# Patient Record
Sex: FEMALE | Race: BLACK OR AFRICAN AMERICAN | Employment: FULL TIME | URBAN - METROPOLITAN AREA
[De-identification: names, ages, dates, MRNs, and addresses within clinical notes are randomized per-mention and may not be internally consistent; named-entity substitution may affect disease eponyms.]

---

## 2018-11-14 ENCOUNTER — APPOINTMENT (OUTPATIENT)
Dept: ULTRASOUND IMAGING | Age: 28
End: 2018-11-14
Payer: COMMERCIAL

## 2018-11-14 ENCOUNTER — HOSPITAL ENCOUNTER (EMERGENCY)
Age: 28
Discharge: HOME | End: 2018-11-14
Attending: EMERGENCY MEDICINE
Payer: COMMERCIAL

## 2018-11-14 VITALS
WEIGHT: 178.57 LBS | HEIGHT: 62 IN | RESPIRATION RATE: 16 BRPM | TEMPERATURE: 98.2 F | SYSTOLIC BLOOD PRESSURE: 119 MMHG | OXYGEN SATURATION: 100 % | BODY MASS INDEX: 32.86 KG/M2 | HEART RATE: 88 BPM | DIASTOLIC BLOOD PRESSURE: 62 MMHG

## 2018-11-14 DIAGNOSIS — R10.9 ABDOMINAL PAIN AFFECTING PREGNANCY: Primary | ICD-10-CM

## 2018-11-14 DIAGNOSIS — O26.899 ABDOMINAL PAIN AFFECTING PREGNANCY: Primary | ICD-10-CM

## 2018-11-14 LAB
A/G RATIO: 1.4 (ref 1.1–2.2)
ALBUMIN SERPL-MCNC: 4.3 G/DL (ref 3.4–5)
ALP BLD-CCNC: 49 U/L (ref 40–129)
ALT SERPL-CCNC: 9 U/L (ref 10–40)
ANION GAP SERPL CALCULATED.3IONS-SCNC: 9 MMOL/L (ref 3–16)
AST SERPL-CCNC: 32 U/L (ref 15–37)
BASOPHILS ABSOLUTE: 0.1 K/UL (ref 0–0.2)
BASOPHILS RELATIVE PERCENT: 0.8 %
BILIRUB SERPL-MCNC: <0.2 MG/DL (ref 0–1)
BILIRUBIN URINE: NEGATIVE
BLOOD, URINE: NEGATIVE
BUN BLDV-MCNC: 11 MG/DL (ref 7–20)
CALCIUM SERPL-MCNC: 9 MG/DL (ref 8.3–10.6)
CHLORIDE BLD-SCNC: 104 MMOL/L (ref 99–110)
CLARITY: CLEAR
CO2: 22 MMOL/L (ref 21–32)
COLOR: YELLOW
CREAT SERPL-MCNC: 0.5 MG/DL (ref 0.6–1.1)
EOSINOPHILS ABSOLUTE: 0 K/UL (ref 0–0.6)
EOSINOPHILS RELATIVE PERCENT: 0.3 %
EPITHELIAL CELLS, UA: 2 /HPF (ref 0–5)
GFR AFRICAN AMERICAN: >60
GFR NON-AFRICAN AMERICAN: >60
GLOBULIN: 3 G/DL
GLUCOSE BLD-MCNC: 122 MG/DL (ref 70–99)
GLUCOSE URINE: NEGATIVE MG/DL
GONADOTROPIN, CHORIONIC (HCG) QUANT: NORMAL MIU/ML
HCG(URINE) PREGNANCY TEST: POSITIVE
HCT VFR BLD CALC: 39.1 % (ref 36–48)
HEMOGLOBIN: 12.6 G/DL (ref 12–16)
HYALINE CASTS: 6 /LPF (ref 0–8)
KETONES, URINE: ABNORMAL MG/DL
LEUKOCYTE ESTERASE, URINE: NEGATIVE
LYMPHOCYTES ABSOLUTE: 2.1 K/UL (ref 1–5.1)
LYMPHOCYTES RELATIVE PERCENT: 17.7 %
MCH RBC QN AUTO: 27 PG (ref 26–34)
MCHC RBC AUTO-ENTMCNC: 32.1 G/DL (ref 31–36)
MCV RBC AUTO: 84.1 FL (ref 80–100)
MICROSCOPIC EXAMINATION: YES
MONOCYTES ABSOLUTE: 0.8 K/UL (ref 0–1.3)
MONOCYTES RELATIVE PERCENT: 6.7 %
NEUTROPHILS ABSOLUTE: 8.7 K/UL (ref 1.7–7.7)
NEUTROPHILS RELATIVE PERCENT: 74.5 %
NITRITE, URINE: NEGATIVE
PDW BLD-RTO: 13.9 % (ref 12.4–15.4)
PH UA: 6
PLATELET # BLD: 190 K/UL (ref 135–450)
PMV BLD AUTO: 8.2 FL (ref 5–10.5)
POTASSIUM SERPL-SCNC: 3.6 MMOL/L (ref 3.5–5.1)
PROTEIN UA: ABNORMAL MG/DL
RBC # BLD: 4.64 M/UL (ref 4–5.2)
RBC UA: 8 /HPF (ref 0–4)
SODIUM BLD-SCNC: 135 MMOL/L (ref 136–145)
SPECIFIC GRAVITY UA: 1.03
TOTAL PROTEIN: 7.3 G/DL (ref 6.4–8.2)
URINE REFLEX TO CULTURE: ABNORMAL
URINE TYPE: ABNORMAL
UROBILINOGEN, URINE: 1 E.U./DL
WBC # BLD: 11.7 K/UL (ref 4–11)
WBC UA: 2 /HPF (ref 0–5)

## 2018-11-14 PROCEDURE — 2580000003 HC RX 258: Performed by: EMERGENCY MEDICINE

## 2018-11-14 PROCEDURE — 96361 HYDRATE IV INFUSION ADD-ON: CPT

## 2018-11-14 PROCEDURE — 99284 EMERGENCY DEPT VISIT MOD MDM: CPT

## 2018-11-14 PROCEDURE — 84703 CHORIONIC GONADOTROPIN ASSAY: CPT

## 2018-11-14 PROCEDURE — 84702 CHORIONIC GONADOTROPIN TEST: CPT

## 2018-11-14 PROCEDURE — 6360000002 HC RX W HCPCS: Performed by: EMERGENCY MEDICINE

## 2018-11-14 PROCEDURE — 96374 THER/PROPH/DIAG INJ IV PUSH: CPT

## 2018-11-14 PROCEDURE — 76801 OB US < 14 WKS SINGLE FETUS: CPT

## 2018-11-14 PROCEDURE — 76817 TRANSVAGINAL US OBSTETRIC: CPT

## 2018-11-14 PROCEDURE — 81001 URINALYSIS AUTO W/SCOPE: CPT

## 2018-11-14 PROCEDURE — 80053 COMPREHEN METABOLIC PANEL: CPT

## 2018-11-14 PROCEDURE — 85025 COMPLETE CBC W/AUTO DIFF WBC: CPT

## 2018-11-14 RX ORDER — 0.9 % SODIUM CHLORIDE 0.9 %
1000 INTRAVENOUS SOLUTION INTRAVENOUS ONCE
Status: COMPLETED | OUTPATIENT
Start: 2018-11-14 | End: 2018-11-14

## 2018-11-14 RX ORDER — ONDANSETRON 2 MG/ML
4 INJECTION INTRAMUSCULAR; INTRAVENOUS ONCE
Status: COMPLETED | OUTPATIENT
Start: 2018-11-14 | End: 2018-11-14

## 2018-11-14 RX ADMIN — ONDANSETRON 4 MG: 2 INJECTION INTRAMUSCULAR; INTRAVENOUS at 18:40

## 2018-11-14 RX ADMIN — SODIUM CHLORIDE 1000 ML: 9 INJECTION, SOLUTION INTRAVENOUS at 18:40

## 2018-11-14 ASSESSMENT — PAIN DESCRIPTION - DESCRIPTORS: DESCRIPTORS: CRAMPING

## 2018-11-14 ASSESSMENT — PAIN DESCRIPTION - PAIN TYPE: TYPE: ACUTE PAIN

## 2018-11-14 ASSESSMENT — PAIN DESCRIPTION - ORIENTATION: ORIENTATION: LOWER

## 2018-11-14 ASSESSMENT — PAIN SCALES - GENERAL: PAINLEVEL_OUTOF10: 7

## 2018-11-14 ASSESSMENT — PAIN DESCRIPTION - LOCATION: LOCATION: ABDOMEN

## 2018-11-15 NOTE — ED PROVIDER NOTES
Basophils % 0.8 %    Neutrophils # 8.7 (H) 1.7 - 7.7 K/uL    Lymphocytes # 2.1 1.0 - 5.1 K/uL    Monocytes # 0.8 0.0 - 1.3 K/uL    Eosinophils # 0.0 0.0 - 0.6 K/uL    Basophils # 0.1 0.0 - 0.2 K/uL   Comprehensive Metabolic Panel   Result Value Ref Range    Sodium 135 (L) 136 - 145 mmol/L    Potassium 3.6 3.5 - 5.1 mmol/L    Chloride 104 99 - 110 mmol/L    CO2 22 21 - 32 mmol/L    Anion Gap 9 3 - 16    Glucose 122 (H) 70 - 99 mg/dL    BUN 11 7 - 20 mg/dL    CREATININE 0.5 (L) 0.6 - 1.1 mg/dL    GFR Non-African American >60 >60    GFR African American >60 >60    Calcium 9.0 8.3 - 10.6 mg/dL    Total Protein 7.3 6.4 - 8.2 g/dL    Alb 4.3 3.4 - 5.0 g/dL    Albumin/Globulin Ratio 1.4 1.1 - 2.2    Total Bilirubin <0.2 0.0 - 1.0 mg/dL    Alkaline Phosphatase 49 40 - 129 U/L    ALT 9 (L) 10 - 40 U/L    AST 32 15 - 37 U/L    Globulin 3.0 g/dL   Urinalysis Reflex to Culture   Result Value Ref Range    Color, UA YELLOW Straw/Yellow    Clarity, UA Clear Clear    Glucose, Ur Negative Negative mg/dL    Bilirubin Urine Negative Negative    Ketones, Urine TRACE (A) Negative mg/dL    Specific Gravity, UA 1.029 1.005 - 1.030    Blood, Urine Negative Negative    pH, UA 6.0 5.0 - 8.0    Protein, UA TRACE (A) Negative mg/dL    Urobilinogen, Urine 1.0 <2.0 E.U./dL    Nitrite, Urine Negative Negative    Leukocyte Esterase, Urine Negative Negative    Microscopic Examination YES     Urine Reflex to Culture Not Indicated     Urine Type Not Specified    Pregnancy, Urine   Result Value Ref Range    HCG(Urine) Pregnancy Test POSITIVE Detects HCG level >20 MIU/mL   HCG, Quantitative, Pregnancy   Result Value Ref Range    hCG Quant 19160.0 <5.0 mIU/mL   Microscopic Urinalysis   Result Value Ref Range    Hyaline Casts, UA 6 0 - 8 /LPF    WBC, UA 2 0 - 5 /HPF    RBC, UA 8 (H) 0 - 4 /HPF    Epi Cells 2 0 - 5 /HPF          EKG: (All EKG's are interpreted by myself in the absence of a cardiologist)      MDM:  Patient's vital signs are stable.   She

## 2018-12-16 ENCOUNTER — HOSPITAL ENCOUNTER (EMERGENCY)
Age: 28
Discharge: LEFT BEFORE DISCHARGE | End: 2018-12-17
Payer: COMMERCIAL

## 2018-12-16 VITALS
HEART RATE: 94 BPM | TEMPERATURE: 99.1 F | OXYGEN SATURATION: 100 % | WEIGHT: 167.99 LBS | SYSTOLIC BLOOD PRESSURE: 114 MMHG | RESPIRATION RATE: 16 BRPM | HEIGHT: 60 IN | BODY MASS INDEX: 32.98 KG/M2 | DIASTOLIC BLOOD PRESSURE: 79 MMHG

## 2018-12-16 DIAGNOSIS — O21.9 NAUSEA AND VOMITING DURING PREGNANCY: Primary | ICD-10-CM

## 2018-12-16 LAB
A/G RATIO: 1.3 (ref 1.1–2.2)
ALBUMIN SERPL-MCNC: 4.4 G/DL (ref 3.4–5)
ALP BLD-CCNC: 54 U/L (ref 40–129)
ALT SERPL-CCNC: 12 U/L (ref 10–40)
ANION GAP SERPL CALCULATED.3IONS-SCNC: 14 MMOL/L (ref 3–16)
AST SERPL-CCNC: 35 U/L (ref 15–37)
BASOPHILS ABSOLUTE: 0 K/UL (ref 0–0.2)
BASOPHILS RELATIVE PERCENT: 0.2 %
BILIRUB SERPL-MCNC: 0.4 MG/DL (ref 0–1)
BILIRUBIN URINE: ABNORMAL
BLOOD, URINE: ABNORMAL
BUN BLDV-MCNC: 8 MG/DL (ref 7–20)
CALCIUM SERPL-MCNC: 9.4 MG/DL (ref 8.3–10.6)
CHLORIDE BLD-SCNC: 102 MMOL/L (ref 99–110)
CLARITY: ABNORMAL
CO2: 21 MMOL/L (ref 21–32)
COLOR: ABNORMAL
CREAT SERPL-MCNC: <0.5 MG/DL (ref 0.6–1.1)
EOSINOPHILS ABSOLUTE: 0 K/UL (ref 0–0.6)
EOSINOPHILS RELATIVE PERCENT: 0.4 %
EPITHELIAL CELLS, UA: 5 /HPF (ref 0–5)
GFR AFRICAN AMERICAN: >60
GFR NON-AFRICAN AMERICAN: >60
GLOBULIN: 3.4 G/DL
GLUCOSE BLD-MCNC: 89 MG/DL (ref 70–99)
GLUCOSE URINE: NEGATIVE MG/DL
HCT VFR BLD CALC: 41 % (ref 36–48)
HEMOGLOBIN: 13.4 G/DL (ref 12–16)
HYALINE CASTS: 13 /LPF (ref 0–8)
KETONES, URINE: 40 MG/DL
LEUKOCYTE ESTERASE, URINE: NEGATIVE
LIPASE: 10 U/L (ref 13–60)
LYMPHOCYTES ABSOLUTE: 1.4 K/UL (ref 1–5.1)
LYMPHOCYTES RELATIVE PERCENT: 22.6 %
MCH RBC QN AUTO: 27.2 PG (ref 26–34)
MCHC RBC AUTO-ENTMCNC: 32.7 G/DL (ref 31–36)
MCV RBC AUTO: 83.3 FL (ref 80–100)
MICROSCOPIC EXAMINATION: YES
MONOCYTES ABSOLUTE: 0.7 K/UL (ref 0–1.3)
MONOCYTES RELATIVE PERCENT: 11.2 %
NEUTROPHILS ABSOLUTE: 4.1 K/UL (ref 1.7–7.7)
NEUTROPHILS RELATIVE PERCENT: 65.6 %
NITRITE, URINE: NEGATIVE
PDW BLD-RTO: 14.4 % (ref 12.4–15.4)
PH UA: 6
PLATELET # BLD: 192 K/UL (ref 135–450)
PMV BLD AUTO: 8.2 FL (ref 5–10.5)
POTASSIUM SERPL-SCNC: 3.8 MMOL/L (ref 3.5–5.1)
PROTEIN UA: 30 MG/DL
RBC # BLD: 4.92 M/UL (ref 4–5.2)
RBC UA: 32 /HPF (ref 0–4)
SODIUM BLD-SCNC: 137 MMOL/L (ref 136–145)
SPECIFIC GRAVITY UA: >1.03
TOTAL PROTEIN: 7.8 G/DL (ref 6.4–8.2)
URINE REFLEX TO CULTURE: ABNORMAL
URINE TYPE: ABNORMAL
UROBILINOGEN, URINE: 1 E.U./DL
WBC # BLD: 6.3 K/UL (ref 4–11)
WBC UA: 3 /HPF (ref 0–5)

## 2018-12-16 PROCEDURE — 81001 URINALYSIS AUTO W/SCOPE: CPT

## 2018-12-16 PROCEDURE — 80053 COMPREHEN METABOLIC PANEL: CPT

## 2018-12-16 PROCEDURE — 83690 ASSAY OF LIPASE: CPT

## 2018-12-16 PROCEDURE — 4500000002 HC ER NO CHARGE

## 2018-12-16 PROCEDURE — 84702 CHORIONIC GONADOTROPIN TEST: CPT

## 2018-12-16 PROCEDURE — 85025 COMPLETE CBC W/AUTO DIFF WBC: CPT

## 2018-12-16 RX ORDER — 0.9 % SODIUM CHLORIDE 0.9 %
1000 INTRAVENOUS SOLUTION INTRAVENOUS ONCE
Status: DISCONTINUED | OUTPATIENT
Start: 2018-12-16 | End: 2018-12-17 | Stop reason: HOSPADM

## 2018-12-16 RX ORDER — ONDANSETRON 2 MG/ML
4 INJECTION INTRAMUSCULAR; INTRAVENOUS ONCE
Status: DISCONTINUED | OUTPATIENT
Start: 2018-12-16 | End: 2018-12-17 | Stop reason: HOSPADM

## 2018-12-16 ASSESSMENT — ENCOUNTER SYMPTOMS
NAUSEA: 1
CHOKING: 0
VOMITING: 1
SORE THROAT: 0
SHORTNESS OF BREATH: 0
EYE DISCHARGE: 0
EYE REDNESS: 0
BACK PAIN: 0
ABDOMINAL PAIN: 0
FACIAL SWELLING: 0
APNEA: 0

## 2018-12-16 ASSESSMENT — PAIN DESCRIPTION - LOCATION: LOCATION: ABDOMEN

## 2018-12-16 ASSESSMENT — PAIN DESCRIPTION - FREQUENCY: FREQUENCY: CONTINUOUS

## 2018-12-16 ASSESSMENT — PAIN DESCRIPTION - PAIN TYPE: TYPE: ACUTE PAIN

## 2018-12-16 ASSESSMENT — PAIN SCALES - GENERAL: PAINLEVEL_OUTOF10: 9

## 2018-12-17 LAB — GONADOTROPIN, CHORIONIC (HCG) QUANT: NORMAL MIU/ML

## 2018-12-17 NOTE — ED PROVIDER NOTES
normal.   Nursing note and vitals reviewed.       MEDICAL DECISION MAKING    Vitals:    Vitals:    12/16/18 2034   BP: 114/79   Pulse: 94   Resp: 16   Temp: 99.1 °F (37.3 °C)   TempSrc: Oral   SpO2: 100%   Weight: 167 lb 15.9 oz (76.2 kg)   Height: 5' (1.524 m)       LABS:  Labs Reviewed   COMPREHENSIVE METABOLIC PANEL - Abnormal; Notable for the following:        Result Value    CREATININE <0.5 (*)     All other components within normal limits    Narrative:     Performed at:  [address] [place]   Phone [phone]   LIPASE - Abnormal; Notable for the following:     Lipase 10.0 (*)     All other components within normal limits    Narrative:     Performed at:  [facility]  [address] [facility] [place]   Phone [phone]   URINE RT REFLEX TO CULTURE - Abnormal; Notable for the following:     Clarity, UA CLOUDY (*)     Bilirubin Urine SMALL (*)     Ketones, Urine 40 (*)     Blood, Urine SMALL (*)     Protein, UA 30 (*)     All other components within normal limits    Narrative:     Performed at:  [facility]  [address] [facility] [place] [address]   Phone [phone]   MICROSCOPIC URINALYSIS - Abnormal; Notable for the following:     Hyaline Casts, UA 13 (*)     RBC, UA 32 (*)     All other components within normal limits    Narrative:     Performed at:  [facility]  [address] [facility] [place] [address]   Phone [phone]   CBC WITH AUTO DIFFERENTIAL    Narrative:     Performed at:  [address] [place] [address] Laboratory  [address] [place] [address] [place]   Phone [phone]   HCG, QUANTITATIVE, PREGNANCY    Narrative:     Performed at:  [address] [place]   Phone [phone] labs reviewed and werenegative at this time or not returned at the time of this note. RADIOLOGY:   Non-plain film images such as CT, Ultrasound and MRI are read by the radiologist. Dillon Riojas PA-C have directly visualized the radiologic plain film image(s) with the below findings:        Interpretation per the Radiologist below, if available at the time of thisnote:    No orders to display        No results found. MEDICAL DECISION MAKING / ED COURSE:      PROCEDURES:   Procedures    None    Patient was given:  Medications - No data to display      Emergency room course: Patient on exam pupils equal round and reactive to light a sock movements intact. Throat is clear and nonerythematous no exudate. Give us a regular rhythm, lungs clear no wheezes rales or rhonchi. Abdomen was soft nontender. Patient full range of motion extremity. Neurologically no motor sensory deficit noted. She is alert and oriented ×4. Does not appear to be in acute distress. Lab results from today CMP shows sodium 137, potassium 3.8, chloride 102 BUN of 8 and creatinine less than 0.5. Lipase 10.0. CBC within normal limits with a white count of 6.3. Urinalysis shows negative leukocytes negative nitrites small blood 40 ketones. Microscopically hyaline cast 13, WBC of 3, RBC of 32 and epithelial cells of 5. Quantitative hCG pending. At this time patient is waiting to get her fluids and Zofran for nausea. I did referred this patient onto nurse practitioner Tato Valverde. I did look for patient ultrasound results for her ultrasound she had a [facility] on December 11. Unable to pull up the results. It does show that she had the ultrasound. Looking over her medical record at [facility] shows on 12/12/18 she was 11 weeks and 5 days. She is O+. See Mary Stephens nurse practitioner ED note for remaining of ER course and final disposition. The patient tolerated their visit well. I evaluated the patient.   The physician was available for consultation as

## 2019-12-26 ENCOUNTER — HOSPITAL ENCOUNTER (EMERGENCY)
Age: 29
Discharge: HOME | End: 2019-12-26
Payer: COMMERCIAL

## 2019-12-26 VITALS
HEART RATE: 82 BPM | HEIGHT: 62 IN | RESPIRATION RATE: 18 BRPM | WEIGHT: 214.95 LBS | OXYGEN SATURATION: 100 % | SYSTOLIC BLOOD PRESSURE: 125 MMHG | BODY MASS INDEX: 39.56 KG/M2 | DIASTOLIC BLOOD PRESSURE: 84 MMHG | TEMPERATURE: 98 F

## 2019-12-26 DIAGNOSIS — B37.31 YEAST VAGINITIS: ICD-10-CM

## 2019-12-26 DIAGNOSIS — L05.01 PILONIDAL ABSCESS: ICD-10-CM

## 2019-12-26 DIAGNOSIS — L02.91 CUTANEOUS ABSCESS, UNSPECIFIED SITE: Primary | ICD-10-CM

## 2019-12-26 PROCEDURE — 99282 EMERGENCY DEPT VISIT SF MDM: CPT

## 2019-12-26 RX ORDER — CLINDAMYCIN HYDROCHLORIDE 300 MG/1
300 CAPSULE ORAL 3 TIMES DAILY
Qty: 30 CAPSULE | Refills: 0 | Status: SHIPPED | OUTPATIENT
Start: 2019-12-26 | End: 2020-01-05

## 2019-12-26 RX ORDER — FLUCONAZOLE 150 MG/1
150 TABLET ORAL ONCE
Qty: 2 TABLET | Refills: 0 | Status: SHIPPED | OUTPATIENT
Start: 2019-12-26 | End: 2019-12-26

## 2019-12-26 ASSESSMENT — PAIN SCALES - GENERAL: PAINLEVEL_OUTOF10: 9

## 2019-12-26 ASSESSMENT — PAIN DESCRIPTION - PROGRESSION
CLINICAL_PROGRESSION: GRADUALLY WORSENING
CLINICAL_PROGRESSION: NOT CHANGED

## 2019-12-26 ASSESSMENT — PAIN - FUNCTIONAL ASSESSMENT: PAIN_FUNCTIONAL_ASSESSMENT: ACTIVITIES ARE NOT PREVENTED

## 2019-12-26 ASSESSMENT — PAIN DESCRIPTION - LOCATION: LOCATION: NOSE

## 2019-12-26 ASSESSMENT — PAIN DESCRIPTION - FREQUENCY: FREQUENCY: CONTINUOUS

## 2019-12-26 ASSESSMENT — PAIN DESCRIPTION - DESCRIPTORS: DESCRIPTORS: TENDER;THROBBING;BURNING

## 2019-12-26 ASSESSMENT — PAIN DESCRIPTION - ONSET: ONSET: ON-GOING

## 2019-12-26 ASSESSMENT — PAIN DESCRIPTION - PAIN TYPE: TYPE: ACUTE PAIN

## 2020-05-19 ENCOUNTER — TELEPHONE (OUTPATIENT)
Dept: FAMILY MEDICINE CLINIC | Age: 30
End: 2020-05-19

## 2022-09-27 ENCOUNTER — HOSPITAL ENCOUNTER (OUTPATIENT)
Dept: PHYSICAL THERAPY | Age: 32
Setting detail: THERAPIES SERIES
Discharge: HOME | End: 2022-09-27
Payer: COMMERCIAL

## 2022-09-27 DIAGNOSIS — M54.42 LEFT-SIDED LOW BACK PAIN WITH LEFT-SIDED SCIATICA, UNSPECIFIED CHRONICITY: Primary | ICD-10-CM

## 2022-09-27 DIAGNOSIS — R29.898 WEAKNESS OF LEFT LOWER EXTREMITY: ICD-10-CM

## 2022-09-27 PROCEDURE — 97161 PT EVAL LOW COMPLEX 20 MIN: CPT | Performed by: PHYSICAL THERAPIST

## 2022-09-27 PROCEDURE — 97110 THERAPEUTIC EXERCISES: CPT | Performed by: PHYSICAL THERAPIST

## 2022-09-27 PROCEDURE — 97140 MANUAL THERAPY 1/> REGIONS: CPT | Performed by: PHYSICAL THERAPIST

## 2022-09-27 NOTE — PLAN OF CARE
[facility] a Department of [address], [address]  Office: [phone]  Fax:  [phone]                                                       Physical Therapy Certification    Dear KASSIDY Bajwa*  ,    We had the pleasure of evaluating the following patient for physical therapy services at [facility]. A summary of our findings can be found in the initial assessment below. This includes our plan of care. If you have any questions or concerns regarding these findings, please do not hesitate to contact me at the office phone number checked above. Thank you for the referral.       Physician Signature:_______________________________Date:__________________  By signing above (or electronic signature), therapists plan is approved by physician      Patient: Odalis Henley   : 1990   MRN: [id number]  Referring Physician: KASSIDY Bajwa*        Evaluation Date: 2022      Medical Diagnosis:  Chronic midline low back pain with left-sided sciatica [M54.42, G89.29]  Treatment Diagnosis:    ICD-10-CM    1. Left-sided low back pain with left-sided sciatica, unspecified chronicity  M54.42       2.  Weakness of left lower extremity  R29.898                                           Insurance information: PT Insurance Information: [employer]     Precautions/ Contra-indications: none    C-SSRS Triggered by Intake questionnaire (Past 2 wk assessment):   [x] No, Questionnaire did not trigger screening.   [] Yes, Patient intake triggered further evaluation      [] C-SSRS Screening completed  [] PCP notified via Plan of Care  [] Emergency services notified     Latex Allergy:  [x]NO      []YES  Preferred Language for Healthcare:   [x]English       []Other:      SUBJECTIVE: Patient stated complaint: pt. States that she started have low back pain for over a year with radiating pain into her L leg going down to her foot; pain started after birth of her child and epidural; pain is getting worse; pt. Reports sleep disturbances; pt. States that she has tried ice and heat without relief; when carrying daughter usually carries on R side; pt.  Reports that the pain is worst with standing     Relevant Medical History: gastric sleeve ~6 years previous, bronchitis  Functional Disability Index: FOTO physical FS primary measure score = 30; Risk adjusted = 55    Pain Scale: 8/10 \"aching\"  Easing factors: stretching calf on a step  Provocative factors: standing at work     Type: [x]Constant   []Intermittent  [x]Radiating []Localized []other:     Numbness/Tingling: constant throughout L LE    Occupation/School: [employer]     Living Status/Prior Level of Function: Independent with ADLs and IADLs, mother to 3 daughters, standing at work    OBJECTIVE:     ROM     Lumbar Flexion ~50% restricted LBP, L lateral thigh pain   Lumbar Extension WFL No sx change    LEFT RIGHT   Lumbar sidebend Restricted  L LE pain WFL   Lumbar Quadrant     Thoracic Rotation      LEFT RIGHT   HIP Flex ~100 WFL   HIP Abd     HIP ER [facility]   HIP IR WFL pain WFL   Knee Flex     Knee ext     Hamstring length (90/90) -30 -20   Piriformis length     Duglas Test          Strength  LEFT RIGHT   ALL NORMAL []      MfA     TrA     HIP Flexors (L1-2) 4 4+   HIP Abductors 4- 4   HIP extension 3+ 4-   Knee EXT (L3) 4 4+   Knee Flex (S1) 4 4+   Ankle DF (L4) 4 4+   Ankle PF (S2) 4 4+   Great Toe Ext (L5) 4 4+       Reflexes  Normal Abnormal Comments   ALL NORMAL []       S1-2 Seated achilles [] [] Symmetrical, diminished   L3-4 Patellar tendon [] [] Symmetrical, diminished        Balance: difficulty with SLS on L LE    Joint mobility:    []Normal    [x]Hypo - L5, S1   []Hyper    Palpation: tenderness L5    Functional Mobility/Transfers: independent    Posture: mild increase lumbar lordosis, weight shift to R, bilateral knee valgus    Bandages/Dressings/Incisions: n/a    Gait: (include devices/WB status) FWB without AD, trendelenburg gait with lateral trunk lean      Neural dynamic tension testing Normal Abnormal Comments   Slump Test  L    SLR   L    Femoral nerve (L2-4) x       Orthopedic Special Tests:    Normal Abnormal NT Comments   Toe walk   x      Heel Walk x      Fwd Bend-aberrant or innominate mvmt)   x    Trendelenburg  x     Kemps/Quadrant   x    Stork   x    LOULOU/Gutierrez x      Hip scour   x    ASLR   x    Crossed SLR   x    Supine to sit   x    Thigh thrust   x    SI distraction/compression x      PA/Spring  x  Pain reproduction L5   Prone Instability test   x    Prone knee bend   x    Sacral Spring/thrust  x  Pain reproduction                                 [x] Patient history, allergies, meds reviewed. Medical chart reviewed. See intake form. Review Of Systems (ROS):  [x]Performed Review of systems (Integumentary, CardioPulmonary, Neurological) by intake and observation. Intake form has been scanned into medical record. Patient has been instructed to contact their primary care physician regarding ROS issues if not already being addressed at this time.       Co-morbidities/Complexities (which will affect course of rehabilitation):   []None           Arthritic conditions   []Rheumatoid arthritis (M05.9)  []Osteoarthritis (M19.91)   Cardiovascular conditions   []Hypertension (I10)  []Hyperlipidemia (E78.5)  []Angina pectoris (I20)  []Atherosclerosis (I70)   Musculoskeletal conditions   []Disc pathology   []Congenital spine pathologies   []Prior surgical intervention  []Osteoporosis (M81.8)  []Osteopenia (M85.8)   Endocrine conditions   []Hypothyroid (E03.9)  []Hyperthyroid Gastrointestinal conditions   []Constipation (T39.79)   Metabolic conditions   []Morbid obesity (E66.01)  []Diabetes type 1(E10.65) or 2 (E11.65)   []Neuropathy (G60.9)     Pulmonary conditions   []Asthma (J45)  []Coughing   []COPD (J44.9) Psychological Disorders  []Anxiety (F41.9)  []Depression (F32.9)   []Other:   [x]Other: 3 prior pregnancies         Barriers to/and or personal factors that will affect rehab potential:              []Age  []Sex              []Motivation/Lack of Motivation                        []Co-Morbidities              []Cognitive Function, education/learning barriers              []Environmental, home barriers              [x]profession/work barriers  [x]past PT/medical experience  []other:  Justification:     Falls Risk Assessment (30 days):   [x] Falls Risk assessed and no intervention required. [] Falls Risk assessed and Patient requires intervention due to being higher risk   TUG score (>12s at risk):     [] Falls education provided, including         ASSESSMENT: pt. Is a 34 yo female with low back pain with radiating symptoms into L LE. High irritability of symptoms at evaluation. Symptoms improved with extension movement and LA distraction. Pt. With difficulty activating core and hip musculature. Pt. Will benefit from skilled therapy in order to reduce symptoms with walking and improve core muscle activation.      Functional Impairments:     [x]Noted lumbar/proximal hip hypomobility   []Noted lumbosacral and/or generalized hypermobility   [x]Decreased Lumbosacral/hip/LE functional ROM   [x]Decreased core/proximal hip strength and neuromuscular control    []Decreased LE functional strength    []Abnormal reflexes/sensation/myotomal/dermatomal deficits  []Reduced balance/proprioceptive control    []other:      Functional Activity Limitations (from functional questionnaire and intake)   [x]Reduced ability to tolerate prolonged functional positions   [x]Reduced ability or difficulty with changes of positions or transfers between positions   [x]Reduced ability to maintain good posture and demonstrate good body mechanics with sitting, bending, and lifting   [x]Reduced ability to sleep   [] Reduced ability or tolerance with driving and/or computer work   [x]Reduced ability to perform lifting, reaching, carrying tasks   [x]Reduced ability to squat   [x]Reduced ability to forward bend   [x]Reduced ability to ambulate prolonged functional periods/distances/surfaces   [x]Reduced ability to ascend/descend stairs   []other:       Participation Restrictions   [x]Reduced participation in self care activities   [x]Reduced participation in home management activities   [x]Reduced participation in work activities   [x]Reduced participation in social activities. []Reduced participation in sport/recreation activities. Classification:   []Signs/symptoms consistent with Lumbar instability/stabilization subgroup. []Signs/symptoms consistent with Lumbar mobilization/manipulation subgroup, myotomes and dermatomes intact. Meets manipulation criteria. [x]Signs/symptoms consistent with Lumbar direction specific/centralization subgroup   []Signs/symptoms consistent with Lumbar traction subgroup     []Signs/symptoms consistent with lumbar facet dysfunction   []Signs/symptoms consistent with lumbar stenosis type dysfunction   []Signs/symptoms consistent with nerve root involvement including myotome & dermatome dysfunction   []Signs/symptoms consistent with post-surgical status including: decreased ROM, strength and function.    []signs/symptoms consistent with pathology which may benefit from Dry needling     []other:      Prognosis/Rehab Potential:      []Excellent   [x]Good    [x]Fair   []Poor    Tolerance of evaluation/treatment:    []Excellent   [x]Good    [x]Fair   []Poor     Physical Therapy Evaluation Complexity Justification  [x] A history of present problem with:  [] no personal factors and/or comorbidities that impact the plan of care;  [x]1-2 personal factors and/or comorbidities that impact the plan of care  []3 personal factors and/or comorbidities that impact the plan of care  [x] An examination of body systems using standardized tests and measures addressing any of the following: body structures and functions (impairments), activity limitations, and/or participation restrictions;:  [] a total of 1-2 or more elements   [] a total of 3 or more elements   [x] a total of 4 or more elements   [x] A clinical presentation with:  [x] stable and/or uncomplicated characteristics   [] evolving clinical presentation with changing characteristics  [] unstable and unpredictable characteristics;   [x] Clinical decision making of [x] low, [] moderate, [] high complexity using standardized patient assessment instrument and/or measurable assessment of functional outcome. [x] EVAL (LOW) 35781 (typically 30 minutes face-to-face)  [] EVAL (MOD) 26084 (typically 30 minutes face-to-face)  [] EVAL (HIGH) 24277 (typically 45 minutes face-to-face)  [] RE-EVAL     PLAN: Begin PT focusing on: proximal hip mobilizations, LB mobs, LB core activation, proximal hip activation, and HEP    Frequency/Duration:  1-2 days per week for 6-8 Weeks:  Interventions:  [x]  Therapeutic exercise including: strength training, ROM, for LE, Glutes and core   [x]  NMR activation and proprioception for glutes , LE and Core   [x]  Manual therapy as indicated for Hip complex, LE and spine to include: Dry Needling/IASTM, STM, PROM, Gr I-IV mobilizations, manipulation. [x]  Modalities as needed that may include: thermal agents, E-stim, Biofeedback, US, iontophoresis as indicated  [x]  Patient education on joint protection, postural re-education, activity modification, progression of HEP. HEP instruction: Written HEP instructions provided and reviewed. GOALS:  Patient stated goal: reduce pain  [] Progressing: [] Met: [] Not Met: [] Adjusted    Therapist goals for Patient:   Short Term Goals: To be achieved in: 2 weeks  1. Independent in HEP and progression per patient tolerance, in order to prevent re-injury. [] Progressing: [] Met: [] Not Met: [] Adjusted  2.  Patient will have a decrease in pain to <5/10 to facilitate improvement in movement, function, and ADLs as indicated by Functional Deficits. [] Progressing: [] Met: [] Not Met: [] Adjusted    Long Term Goals: To be achieved in: 6-8 weeks  1. Patient will reach FOTO predicted score of at least 55 to assist with reaching prior level of function with activities such as sleeping. [] Progressing: [] Met: [] Not Met: [] Adjusted  2. Patient will demonstrate increased AROM lumbar flexion and extension to WNL without radiating symptoms, good LS mobility, good hip ROM to allow for proper joint functioning as indicated by patients Functional Deficits. [] Progressing: [] Met: [] Not Met: [] Adjusted  3. Patient will demonstrate an increase in Strength to at least 4/5 throughout and good proximal hip and core activation to allow for proper functional mobility as indicated by patients Functional Deficits. [] Progressing: [] Met: [] Not Met: [] Adjusted  4. Patient will return to bending forward to care for her children without increased symptoms or restriction. [] Progressing: [] Met: [] Not Met: [] Adjusted  5. Patient will return to standing for 30+ minutes without increased symptoms to allow patient to work with decreased pain.    [] Progressing: [] Met: [] Not Met: [] Adjusted     Electronically signed by:  James Rivas PT

## 2022-09-27 NOTE — FLOWSHEET NOTE
Hip belt mobs       Hip LA distraction L 3'            NMR re-education                                             Pt. Education:  -pt educated on diagnosis, prognosis and expectations for rehab  -all pt questions were answered    Home Exercise Program:  Access Code: [email or web address]  URL: [email or web address]  Date: 09/27/2022  Prepared by: Pema Shell    Exercises  Lying Prone - 1 x daily - 7 x weekly - 2 reps - 2m hold  Static Prone on Elbows - 1 x daily - 7 x weekly - 1 reps - 2m hold  Prone Press Up On Elbows - 1 x daily - 7 x weekly - 10 reps  Prone Gluteal Sets - 1 x daily - 7 x weekly - 10 reps - 5s hold      Therapeutic Exercise and NMR EXR  [x] (65454) Provided verbal/tactile cueing for activities related to strengthening, flexibility, endurance, ROM  for improvements in proximal hip and core control with self care, mobility, lifting and ambulation.  [] (12799) Provided verbal/tactile cueing for activities related to improving balance, coordination, kinesthetic sense, posture, motor skill, proprioception  to assist with core control in self care, mobility, lifting, and ambulation.      Therapeutic Activities:    [] (46438 or 89189) Provided verbal/tactile cueing for activities related to improving balance, coordination, kinesthetic sense, posture, motor skill, proprioception and motor activation to allow for proper function  with self care and ADLs  [] (24077) Provided training and instruction to the patient for proper core and proximal hip recruitment and positioning with ambulation re-education     Home Exercise Program:    [x] (29008) Reviewed/Progressed HEP activities related to strengthening, flexibility, endurance, ROM of core, proximal hip and LE for functional self-care, mobility, lifting and ambulation   [] (39405) Reviewed/Progressed HEP activities related to improving balance, coordination, kinesthetic sense, posture, motor skill, proprioception of core, proximal hip and LE for self care, mobility, lifting, and ambulation      Manual Treatments:  PROM / STM / Oscillations-Mobs:  G-I, II, III, IV (PA's, Inf., Post.)  [x] (52826) Provided manual therapy to mobilize proximal hip and LS spine soft tissue/joints for the purpose of modulating pain, promoting relaxation,  increasing ROM, reducing/eliminating soft tissue swelling/inflammation/restriction, improving soft tissue extensibility and allowing for proper ROM for normal function with self care, mobility, lifting and ambulation. Modalities:       Charges:  Timed Code Treatment Minutes: 28   Total Treatment Minutes: 44       [x] EVAL (LOW) 67973 (typically 20 minutes face-to-face)  [] EVAL (MOD) 86163 (typically 30 minutes face-to-face)  [] EVAL (HIGH) 17593 (typically 45 minutes face-to-face)  [] RE-EVAL     [x] TT(27577) x 1    [] IONTO (87798)  [] NMR (93019) x     [] VASO (38593)  [x] Manual (59782) x  1   [] Other:  [] TA (15016)x     [] Mech Traction (75455)  [] ES(attended) (55811)     [] ES (un) (32993):     GOALS:  Patient stated goal: reduce pain  [] Progressing: [] Met: [] Not Met: [] Adjusted    Therapist goals for Patient:   Short Term Goals: To be achieved in: 2 weeks  1. Independent in HEP and progression per patient tolerance, in order to prevent re-injury. [] Progressing: [] Met: [] Not Met: [] Adjusted  2. Patient will have a decrease in pain to <5/10 to facilitate improvement in movement, function, and ADLs as indicated by Functional Deficits. [] Progressing: [] Met: [] Not Met: [] Adjusted    Long Term Goals: To be achieved in: 6-8 weeks  1. Patient will reach FOTO predicted score of at least 55 to assist with reaching prior level of function with activities such as sleeping. [] Progressing: [] Met: [] Not Met: [] Adjusted  2.  Patient will demonstrate increased AROM lumbar flexion and extension to WNL without radiating symptoms, good LS mobility, good hip ROM to allow for proper joint functioning as indicated by patients Functional Deficits. [] Progressing: [] Met: [] Not Met: [] Adjusted  3. Patient will demonstrate an increase in Strength to at least 4/5 throughout and good proximal hip and core activation to allow for proper functional mobility as indicated by patients Functional Deficits. [] Progressing: [] Met: [] Not Met: [] Adjusted  4. Patient will return to bending forward to care for her children without increased symptoms or restriction. [] Progressing: [] Met: [] Not Met: [] Adjusted  5. Patient will return to standing for 30+ minutes without increased symptoms to allow patient to work with decreased pain. [] Progressing: [] Met: [] Not Met: [] Adjusted       ASSESSMENT:  See eval    Treatment/Activity Tolerance:  [x] Patient tolerated treatment well [] Patient limited by fatique  [] Patient limited by pain  [] Patient limited by other medical complications  [] Other:     Overall Progression Towards Functional goals/ Treatment Progress Update:  [] Patient is progressing as expected towards functional goals listed. [] Progression is slowed due to complexities/Impairments listed. [] Progression has been slowed due to co-morbidities. [x] Plan just implemented, too soon to assess goals progression <30days   [] Goals require adjustment due to lack of progress  [] Patient is not progressing as expected and requires additional follow up with physician  [] Other:    Prognosis for POC: [x] Good [] Fair  [] Poor    Patient requires continued skilled intervention: [x] Yes  [] No        PLAN: See eval  [] Continue per plan of care [] Alter current plan (see comments)  [x] Plan of care initiated [] Hold pending MD visit [] Discharge    Electronically signed by: Foye Soulier, PT    Note: If patient does not return for scheduled/recommended follow up visits, this note will serve as a discharge from care along with the most recent update on progress.

## 2022-10-04 ENCOUNTER — HOSPITAL ENCOUNTER (OUTPATIENT)
Dept: PHYSICAL THERAPY | Age: 32
Setting detail: THERAPIES SERIES
Discharge: HOME | End: 2022-10-04
Payer: COMMERCIAL

## 2022-10-04 PROCEDURE — 97140 MANUAL THERAPY 1/> REGIONS: CPT | Performed by: PHYSICAL THERAPIST

## 2022-10-04 PROCEDURE — 97110 THERAPEUTIC EXERCISES: CPT | Performed by: PHYSICAL THERAPIST

## 2022-10-04 PROCEDURE — 97112 NEUROMUSCULAR REEDUCATION: CPT | Performed by: PHYSICAL THERAPIST

## 2022-10-04 NOTE — FLOWSHEET NOTE
[facility] and Rehabilitation a Department of [address]  [address]  Office: [phone]  Fax:  [phone]      Date:  10/04/2022    Patient Name:  Rivera Rosario    :  1990  MRN: [id number]  Medical Diagnosis:  Chronic midline low back pain with left-sided sciatica [M54.42, G89.29]  Treatment Diagnosis:    ICD-10-CM    1. Left-sided low back pain with left-sided sciatica, unspecified chronicity  M54.42       2. Weakness of left lower extremity  R29.898         Insurance/Certification information:  PT Insurance Information: [facility]  Physician Information:  KASSIDY Schulz*    Plan of care signed (Y/N): []  Yes [x]  No  Date sent: 22    Date of Patient follow up with Physician:      Progress Report: []  Yes  [x]  No     Functional Scale:           Date assessed:  [facility] physical FS primary measure score = 30; risk adjusted = 55  22    Date Range for reporting period:  Beginnin22  Ending:      Progress report due (10 Rx/or 30 days whichever is less):      Recertification due (POC duration/ or 90 days whichever is less): 22     Visit # Insurance Allowable Auth required? Date Range   2 auth [x]  Yes  []  No ???     Pain level:  5.5/10     SUBJECTIVE:  She feels her pain is a little higher as she just go off work. Her pain is in the front of the left leg. Her symptoms are all the way into her toes. Her symptoms are worse when she stands still. Sitting does not make her symptoms less intense.       OBJECTIVE: See eval  Observation:   Test measurements:      RESTRICTIONS/PRECAUTIONS: none    Treatment based classification: extension    Exercises/Interventions:       Exercise/Equipment Resistance/Repetitions Other comments   Hamstring stretch 2x:20 supine  Seated propped 2x:20    Piriformis stretch 3x:20 Increased symptoms   Knee to chest 5x:10 Seated bilateral Seated on heels low back stretch     Pelvic tilts 10x:05 seated   TrA contraction     TrA contraction with alt marches     Prone press ups Prone laying 5'    BS     B hip abd with TB     Left quadratus stretch seated 3x:30                        Low lumbar rotation     bridges     Cat/camel      bird/dog     Opposite UE to LE isometric core     TrA/mutlifidi walk outs     TrA/multifidi push outs          Seated Thoracic ROT      Sidelying thoracic rotation          SB pikes     SB knee tucks     SB roll outs     planks          Bike          Manual     PROM R Hip      Inf Joint Mobs, posterior mobs       STM- in L Sidelying     STM over low back in left sidelying     PA mobs Lumbar spine 2' stopped due to increased pain    Manual traction over ball 5'    Left hip long axis distraction 4'             Pt. Education:  -pt educated on diagnosis, prognosis and expectations for rehab  -all pt questions were answered    Home Exercise Program:  Access Code: [email or web address]  URL: [email or web address]  Date: 09/27/2022  Prepared by: Alexandra Quiet    Exercises  Lying Prone - 1 x daily - 7 x weekly - 2 reps - 2m hold  Static Prone on Elbows - 1 x daily - 7 x weekly - 1 reps - 2m hold  Prone Press Up On Elbows - 1 x daily - 7 x weekly - 10 reps  Prone Gluteal Sets - 1 x daily - 7 x weekly - 10 reps - 5s hold      Therapeutic Exercise and NMR EXR  [x] (69609) Provided verbal/tactile cueing for activities related to strengthening, flexibility, endurance, ROM  for improvements in proximal hip and core control with self care, mobility, lifting and ambulation.  [] (72945) Provided verbal/tactile cueing for activities related to improving balance, coordination, kinesthetic sense, posture, motor skill, proprioception  to assist with core control in self care, mobility, lifting, and ambulation.      Therapeutic Activities:    [] (40501 or 97900) Provided verbal/tactile cueing for activities related to improving balance, coordination, kinesthetic sense, posture, motor skill, proprioception and motor activation to allow for proper function  with self care and ADLs  [] (14125) Provided training and instruction to the patient for proper core and proximal hip recruitment and positioning with ambulation re-education     Home Exercise Program:    [x] (94041) Reviewed/Progressed HEP activities related to strengthening, flexibility, endurance, ROM of core, proximal hip and LE for functional self-care, mobility, lifting and ambulation   [] (42289) Reviewed/Progressed HEP activities related to improving balance, coordination, kinesthetic sense, posture, motor skill, proprioception of core, proximal hip and LE for self care, mobility, lifting, and ambulation      Manual Treatments:  PROM / STM / Oscillations-Mobs:  G-I, II, III, IV (PA's, Inf., Post.)  [x] (46566) Provided manual therapy to mobilize proximal hip and LS spine soft tissue/joints for the purpose of modulating pain, promoting relaxation,  increasing ROM, reducing/eliminating soft tissue swelling/inflammation/restriction, improving soft tissue extensibility and allowing for proper ROM for normal function with self care, mobility, lifting and ambulation. Modalities:   MHP prone 10'    Charges:   Timed Code Treatment Minutes: 48'   Total Treatment Minutes: 79'       [] EVAL (LOW) 79033 (typically 20 minutes face-to-face)  [] EVAL (MOD) 64782 (typically 30 minutes face-to-face)  [] EVAL (HIGH) 35688 (typically 45 minutes face-to-face)  [] RE-EVAL     [x] XA(01073) x 2    [] IONTO (87650)  [x] NMR (02069) x 1    [] VASO (28326)  [x] Manual (76674) x  1   [] Other:  [] TA (06533)x     [] Mech Traction (19798)  [] ES(attended) (61358)     [] ES (un) (94449):     GOALS:  Patient stated goal: reduce pain  [] Progressing: [] Met: [] Not Met: [] Adjusted    Therapist goals for Patient:   Short Term Goals: To be achieved in: 2 weeks  1.  Independent in HEP and progression per patient tolerance, in order to prevent re-injury. [] Progressing: [] Met: [] Not Met: [] Adjusted  2. Patient will have a decrease in pain to <5/10 to facilitate improvement in movement, function, and ADLs as indicated by Functional Deficits. [] Progressing: [] Met: [] Not Met: [] Adjusted    Long Term Goals: To be achieved in: 6-8 weeks  1. Patient will reach FOTO predicted score of at least 55 to assist with reaching prior level of function with activities such as sleeping. [] Progressing: [] Met: [] Not Met: [] Adjusted  2. Patient will demonstrate increased AROM lumbar flexion and extension to WNL without radiating symptoms, good LS mobility, good hip ROM to allow for proper joint functioning as indicated by patients Functional Deficits. [] Progressing: [] Met: [] Not Met: [] Adjusted  3. Patient will demonstrate an increase in Strength to at least 4/5 throughout and good proximal hip and core activation to allow for proper functional mobility as indicated by patients Functional Deficits. [] Progressing: [] Met: [] Not Met: [] Adjusted  4. Patient will return to bending forward to care for her children without increased symptoms or restriction. [] Progressing: [] Met: [] Not Met: [] Adjusted  5. Patient will return to standing for 30+ minutes without increased symptoms to allow patient to work with decreased pain. [] Progressing: [] Met: [] Not Met: [] Adjusted       ASSESSMENT:      Treatment/Activity Tolerance:  [x] Patient tolerated treatment well [] Patient limited by fatique  [] Patient limited by pain  [] Patient limited by other medical complications  [] Other: Pt charbel tx fair. She has had long term symptoms in her left leg s/p epidural.  She does report changed sensation in her LLE. She did have increased symptoms with lumbar ext, but forward flexion did not alleviate her symptoms either. It was unclear whether or not traction was beneficial.  She would benefit from another trial of it.   Pt does demo poor mechanics with attempting posterior pelvic tilts. This was better performed in seated position. Pt would continue to benefit from skilled PT to improve strength, ROM, flexibility, pain, and function. Overall Progression Towards Functional goals/ Treatment Progress Update:  [] Patient is progressing as expected towards functional goals listed. [] Progression is slowed due to complexities/Impairments listed. [] Progression has been slowed due to co-morbidities. [x] Plan just implemented, too soon to assess goals progression <30days   [] Goals require adjustment due to lack of progress  [] Patient is not progressing as expected and requires additional follow up with physician  [] Other:    Prognosis for POC: [x] Good [] Fair  [] Poor    Patient requires continued skilled intervention: [x] Yes  [] No        PLAN: Consider more traction based tx NV. [x] Continue per plan of care [] Alter current plan (see comments)  [] Plan of care initiated [] Hold pending MD visit [] Discharge    Electronically signed by: Nafisa Vu PT. DPT, Board-Certified Specialist in Orthopaedics [id number]       Note: If patient does not return for scheduled/recommended follow up visits, this note will serve as a discharge from care along with the most recent update on progress.

## 2022-10-11 ENCOUNTER — HOSPITAL ENCOUNTER (OUTPATIENT)
Dept: PHYSICAL THERAPY | Age: 32
Setting detail: THERAPIES SERIES
Discharge: HOME | End: 2022-10-11
Payer: COMMERCIAL

## 2022-10-11 NOTE — FLOWSHEET NOTE
100 [facility] Performance and Rehabilitation a Department of [address]  [address] [place] [address]  Office: [phone]  Fax:  [phone]  _________________________________________________________________    Physical Therapy  Cancellation/No-show Note  Patient Name:  Kenyatta Kim  :  1990   Date:  10/11/2022  Cancelled visits to date: 1  No-shows to date: 0    For today's appointment patient:  [x]  Cancelled  []  Rescheduled appointment  []  No-show     Reason given by patient:  []  Patient ill  []  Conflicting appointment  []  No transportation    []  Conflict with work  []  No reason given  [x]  Other:     Comments:  Pt was moved to earlier in the day as she agreed to this. She called before this appointment, but a reason was not given. She simply asked for a return call, which we did provide, but at this given time, we have not heard back from her. If the pt does not return, this note can be considered a D/C.      Electronically signed by:  Marianne Lorenzo, PT, PT, DPT, Board-Certified Specialist in [place] [id number]

## 2022-10-18 ENCOUNTER — HOSPITAL ENCOUNTER (OUTPATIENT)
Dept: PHYSICAL THERAPY | Age: 32
Setting detail: THERAPIES SERIES
Discharge: HOME | End: 2022-10-18
Payer: COMMERCIAL

## 2022-10-18 NOTE — FLOWSHEET NOTE
100 [facility] Performance and Rehabilitation a Department of [address]  [address], [address]  Office: [phone]  Fax:  [phone]  _________________________________________________________________    Physical Therapy  Cancellation/No-show Note  Patient Name:  Brian Regalado  :  1990   Date:  10/18/2022  Cancelled visits to date: 1  No-shows to date: 1    For today's appointment patient:  [x]  Cancelled  []  Rescheduled appointment  []  No-show     Reason given by patient:  []  Patient ill  []  Conflicting appointment  []  No transportation    []  Conflict with work  []  No reason given  [x]  Other:     Comments:  Pt no showed her appointment. This is the second appointment in a row she has not made, and we have been unable to contact her. We will attempt to call her again later this week. If we are unable to contact her, we will ask that she calls back the day of to see if we have openings to fit her in, and we will cancel future appointments. If the pt does not return, this note can be considered a D/C.      Electronically signed by:  Patrice Bhardwaj, PT, PT, DPT, Board-Certified Specialist in [facility]

## 2022-10-25 ENCOUNTER — HOSPITAL ENCOUNTER (OUTPATIENT)
Dept: PHYSICAL THERAPY | Age: 32
Setting detail: THERAPIES SERIES
Discharge: HOME | End: 2022-10-25
Payer: COMMERCIAL

## 2022-10-25 PROCEDURE — 97530 THERAPEUTIC ACTIVITIES: CPT | Performed by: PHYSICAL THERAPIST

## 2022-10-25 PROCEDURE — 97110 THERAPEUTIC EXERCISES: CPT | Performed by: PHYSICAL THERAPIST

## 2022-10-25 PROCEDURE — 97112 NEUROMUSCULAR REEDUCATION: CPT | Performed by: PHYSICAL THERAPIST

## 2022-10-25 NOTE — PLAN OF CARE
[facility] Performance and Rehabilitation a Department of [address]  Noe Rocha [place] [address], [address]  Office: [phone]  Fax:  [phone]   Physical Therapy Re-Certification Plan of Care    Dear Abel MOMIN,    We had the pleasure of treating the following patient for physical therapy services at [address] [place] [address]. A summary of our findings can be found in the updated assessment below. This includes our plan of care. If you have any questions or concerns regarding these findings, please do not hesitate to contact me at the office phone number checked above. Thank you for the referral.     Physician Signature:________________________________Date:__________________  By signing above (or electronic signature), therapists plan is approved by physician    Date Range Of Visits: 9/27/22-10/25/2022  Total Visits to Date: 2  Overall Response to Treatment:   [] Patient is responding well to treatment and improvement is noted with regards to goals   [] Patient should continue to improve in reasonable time if they continue HEP   [] Patient has plateaued and is no longer responding to skilled PT intervention    [] Patient is getting worse and would benefit from return to referring MD   [] Patient unable to adhere to initial POC   [x] Other: Pt charbel tx fair. Further assessment showed flexion preference due to centralization of sx with repeated flexion and peripheralization with repeated extension motions. She additionally presents with gross LE weakness in myotomes, with larger differences seen on LLE. Diminished sensation in dermatomes on LLE present as well. Hip ROM is WNL besides for Ir which is slightly limited bilaterally. No hip motions seemed to cause discomfort. Pt responded well to flexion-based stretching exercises but required a fair mount of tactile and verbal cueing for pelvic tilts and ADIM.  She was initially very painful and peripherally symptomatic but this was improved by the end of the session. Pt will benefit frm skilled PT that addresses flexion-based pattern, improves ROM, core strength, myotomal strength, and hip ROM to reduce pain and improve fxn. Due to involvement and chronicity, Pt will require treatment for at least 8 weeks, 1-2x per week. Largest current barrier will be job demands and compliance with appointments/HEP. Education provided on HEP and modifications that can be made (I.e. stretching during downtime at work). Date:  10/25/2022    Patient Name:  Dawna Jolley    :  1990  MRN: [id number]  Medical Diagnosis:  Chronic midline low back pain with left-sided sciatica [M54.42, G89.29]  Treatment Diagnosis:    ICD-10-CM    1. Left-sided low back pain with left-sided sciatica, unspecified chronicity  M54.42       2. Weakness of left lower extremity  R29.898         Insurance/Certification information:  PT Insurance Information: [facility]  Physician Information:  KASSIDY Jones*    Plan of care signed (Y/N): []  Yes [x]  No  Date sent: 22    Date of Patient follow up with Physician:      Progress Report: [x]  Yes  []  No     Functional Scale:           Date assessed:  [facility] physical FS primary measure score = 41      10/25/22    Date Range for reporting period:  Beginnin22  Ending:  10/25/22    Progress report due (10 Rx/or 30 days whichever is less):      Recertification due (POC duration/ or 90 days whichever is less): 22     Visit # Insurance Allowable Auth required? Date Range   3 auth [x]  Yes  []  No ???     Pain level:  5/10 with sx into LLE     SUBJECTIVE:  She has been dealing with a tingling feeling in her lower leg coming from her lower back that is increased with standing. She feels numbness as well that crosses into the front of her thigh and down into her toes.      She has been at work all day and feels that as she is moving there is not too much discomfort, but after she stops her work shift her pain increases. She is very busy and gets only short breaks but is typically standing throughout her shift. She noted that her pain will improve with laying down and sitting but will increase when standing for prolonged periods.      Her pain began previously after her C section about a year ago    OBJECTIVE: 25 Oct 2022  Observation:   Test measurements:    ROM       Lumbar Flexion 85  LBP, L lateral thigh pain   Lumbar Extension 25 Px down leg     LEFT RIGHT   Lumbar sidebend Restricted  L LE pain WFL   Lumbar Quadrant       Thoracic Rotation         LEFT RIGHT   HIP Flex ~100 WFL   HIP Abd       HIP ER WFL WFL   HIP IR 32 32   Knee Flex       Knee ext       Hamstring length (90/90) -30 -20   SLR P 80 A 45 px P 80 A 60   Piriformis length       Duglas Test               Strength  LEFT RIGHT   ALL NORMAL []        MfA       TrA       HIP Flexors (L1-2) 3+ 4-   HIP Abductors 3+ 3+   HIP extension 3+ 4-   Knee EXT (L3) 4 4+   Knee Flex (S1) 4- 4   Ankle DF (L4) 4- 4   Ankle PF (S2) 4 4   Great Toe Ext (L5) 4- 4   Dermatomes: L side dermatomes diminished   Reflexes: Unable to assess Achilles reflex due to guarding , patellar reflex WNL B  Repeated motions + for extension (peripheralization) - for flexion (centralization)      RESTRICTIONS/PRECAUTIONS: none    Treatment based classification: extension    Exercises/Interventions:       Exercise/Equipment Resistance/Repetitions Other comments   Hamstring stretch    Piriformis stretch Increased symptoms   Knee to chest Seated bilateral   Seated on heels low back stretch     Supine Pelvic tilts 10x c ADIM- posterior only    Seated Pelvic tilts 10x B directions    TrA contraction     TrA contraction with alt marches     Prone press ups    BS     B hip abd with TB     Left quadratus stretch seated    Seated lumbar flexion 3' Roll out on physio ball                  Low lumbar rotation     bridges Cat/camel      bird/dog     Opposite UE to LE isometric core     TrA/mutlifidi walk outs     TrA/multifidi push outs          Seated Thoracic ROT      Sidelying thoracic rotation          SB pikes     SB knee tucks     SB roll outs     planks          Bike          Manual     PROM R Hip      Inf Joint Mobs, posterior mobs       STM- in L Sidelying     STM over low back in left sidelying     PA mobs Lumbar spine    Manual traction over ball 2 x 1 min No changes in sx + or - discomfort    Left hip long axis distraction             Pt. Education:  -pt educated on diagnosis, prognosis and expectations for rehab  -all pt questions were answered    Home Exercise Program:  Access Code: [email or web address]  URL: [email or web address]  Date: 10/25/2022  Prepared by: Emmanuelle Maldonado Cessna    Exercises  Supine Posterior Pelvic Tilt - 2 x daily - 7 x weekly - 3 sets - 10 reps  Supine Transversus Abdominis Bracing - Hands on Stomach - 2 x daily - 7 x weekly - 3 sets - 10 reps  Seated Lumbar Flexion Stretch - 2 x daily - 7 x weekly - 3 sets - 30 hold  Seated Posterior Pelvic Tilt - 2 x daily - 7 x weekly - 3 sets - 10 reps        Therapeutic Exercise and NMR EXR  [x] (04963) Provided verbal/tactile cueing for activities related to strengthening, flexibility, endurance, ROM  for improvements in proximal hip and core control with self care, mobility, lifting and ambulation. [x] (21398) Provided verbal/tactile cueing for activities related to improving balance, coordination, kinesthetic sense, posture, motor skill, proprioception  to assist with core control in self care, mobility, lifting, and ambulation.      Therapeutic Activities:    [x] (60369 or 76206) Provided verbal/tactile cueing for activities related to improving balance, coordination, kinesthetic sense, posture, motor skill, proprioception and motor activation to allow for proper function  with self care and ADLs  [x] (90185) Provided training and instruction to the patient for proper core and proximal hip recruitment and positioning with ambulation re-education     Home Exercise Program:    [x] (67881) Reviewed/Progressed HEP activities related to strengthening, flexibility, endurance, ROM of core, proximal hip and LE for functional self-care, mobility, lifting and ambulation   [x] (34452) Reviewed/Progressed HEP activities related to improving balance, coordination, kinesthetic sense, posture, motor skill, proprioception of core, proximal hip and LE for self care, mobility, lifting, and ambulation      Manual Treatments:  PROM / STM / Oscillations-Mobs:  G-I, II, III, IV (PA's, Inf., Post.)  [x] (21591) Provided manual therapy to mobilize proximal hip and LS spine soft tissue/joints for the purpose of modulating pain, promoting relaxation,  increasing ROM, reducing/eliminating soft tissue swelling/inflammation/restriction, improving soft tissue extensibility and allowing for proper ROM for normal function with self care, mobility, lifting and ambulation. Modalities:     Charges:   Timed Code Treatment Minutes: 48'   Total Treatment Minutes: 61'       [] EVAL (LOW) 455 1011 (typically 20 minutes face-to-face)  [] EVAL (MOD) 47662 (typically 30 minutes face-to-face)  [] EVAL (HIGH) 44310 (typically 45 minutes face-to-face)  [] RE-EVAL     [x] (83548) x  1  [] IONTO (91746)  [x] NMR (21482) x   2  [] VASO (07981)  [] Manual (58421) x     [] Other:  [x] TA (43577)x  1  [] Mech Traction (31814)  [] ES(attended) (81095)     [] ES (un) (20247):     GOALS:  Patient stated goal: reduce pain  [x] Progressing: [] Met: [] Not Met: [] Adjusted    Therapist goals for Patient:   Short Term Goals: To be achieved in: 2 weeks  1. Independent in HEP and progression per patient tolerance, in order to prevent re-injury. [] Progressing: [] Met: [] Not Met: [x] Adjusted: New exercise plan to be reassessed in future  2.  Patient will have a decrease in pain to <5/10 to facilitate improvement in movement, function, and ADLs as indicated by Functional Deficits. [x] Progressing: [] Met: [] Not Met: [] Adjusted    Long Term Goals: To be achieved in: 6-8 weeks  1. Patient will reach FOTO predicted score of at least 55 to assist with reaching prior level of function with activities such as sleeping. [x] Progressing: [] Met: [] Not Met: [] Adjusted  2. Patient will demonstrate increased AROM lumbar flexion and extension to WNL without radiating symptoms, good LS mobility, good hip ROM to allow for proper joint functioning as indicated by patients Functional Deficits. [x] Progressing: [] Met: [] Not Met: [] Adjusted  3. Patient will demonstrate an increase in Strength to at least 4/5 throughout and good proximal hip and core activation to allow for proper functional mobility as indicated by patients Functional Deficits. [] Progressing: [] Met: [x] Not Met: [] Adjusted   4. Patient will return to bending forward to care for her children without increased symptoms or restriction. [x] Progressing: [] Met: [] Not Met: [] Adjusted  5. Patient will return to standing for 30+ minutes without increased symptoms to allow patient to work with decreased pain. [] Progressing: [] Met: [x] Not Met: [] Adjusted       ASSESSMENT:      Treatment/Activity Tolerance:  [] Patient tolerated treatment well [] Patient limited by fatigue  [x] Patient limited by pain  [] Patient limited by other medical complications  [x] Other: Pt charbel tx fair. Further assessment showed flexion preference due to centralization of sx with repeated flexion and peripheralization with repeated extension motions. She additionally presents with gross LE weakness in myotomes, with larger differences seen on LLE. Diminished sensation in dermatomes on LLE present as well. Hip ROM is WNL besides for Ir which is slightly limited bilaterally. No hip motions seemed to cause discomfort.  Pt responded well to flexion-based stretching exercises but required a fair mount of tactile and verbal cueing for pelvic tilts and ADIM. She was initially very painful and peripherally symptomatic but this was improved by the end of the session. Pt will benefit frm skilled PT that addresses flexion-based pattern, improves ROM, core strength, myotomal strength, and hip ROM to reduce pain and improve fxn. Due to involvement and chronicity, Pt will require treatment for at least 8 weeks, 1-2x per week. Largest current barrier will be job demands and compliance with appointments/HEP. Education provided on HEP and modifications that can be made (I.e. stretching during downtime at work). Overall Progression Towards Functional goals/ Treatment Progress Update:  [] Patient is progressing as expected towards functional goals listed. [] Progression is slowed due to complexities/Impairments listed. [] Progression has been slowed due to co-morbidities. [x] Plan just implemented, too soon to assess goals progression <30days   [] Goals require adjustment due to lack of progress  [] Patient is not progressing as expected and requires additional follow up with physician  [x] Other: Difficulty in previous schedules and appointments has limited her progress thus far. Proper dx and treatment approach should lead to improvements moving forward if pt compliance improves. Prognosis for POC: [x] Good [x] Fair  [] Poor    Patient requires continued skilled intervention: [x] Yes  [] No        PLAN: Consider more traction based tx NV. [x] Continue per plan of care [] Alter current plan (see comments)  [] Plan of care initiated [] Hold pending MD visit [] Discharge    Electronically signed by:   Charity Rosenbaum, Student Physical Therapist  Therapist was present, directed the patient's care, made skilled judgement, and was responsible for assessment and treatment of the patient.       Willie Nguyen, PT. DPT, Board-Certified Specialist in [facility]       Note: If patient does not return for scheduled/recommended follow up visits, this note will serve as a discharge from care along with the most recent update on progress.

## 2022-11-02 ENCOUNTER — HOSPITAL ENCOUNTER (OUTPATIENT)
Dept: PHYSICAL THERAPY | Age: 32
Setting detail: THERAPIES SERIES
Discharge: HOME | End: 2022-11-02
Payer: COMMERCIAL

## 2022-11-02 NOTE — FLOWSHEET NOTE
100 [facility] Performance and Rehabilitation a Department of [address]  [place] [address], [address]  Office: [phone]  Fax:  [phone]  _________________________________________________________________    Physical Therapy  Cancellation/No-show Note  Patient Name:  Galo Caputo  :  1990   Date:  2022  Cancelled visits to date: 2  No-shows to date: 1    For today's appointment patient:  [x]  Cancelled  []  Rescheduled appointment  []  No-show     Reason given by patient:  []  Patient ill  []  Conflicting appointment  []  No transportation    []  Conflict with work  []  No reason given  [x]  Other:     Comments:  Pt called to cancel as she is stuck at the dentist having a tooth extraction. This is the third cancel/no show of 6 scheduled appointments. If the pt does not return, this note can be considered a D/C.      Electronically signed by:  Maryjane Cardenas, PT, PT, DPT, Board-Certified Specialist in [facility]

## 2022-11-07 ENCOUNTER — HOSPITAL ENCOUNTER (OUTPATIENT)
Dept: PHYSICAL THERAPY | Age: 32
Setting detail: THERAPIES SERIES
Discharge: HOME | End: 2022-11-07
Payer: COMMERCIAL

## 2022-11-07 PROCEDURE — 97112 NEUROMUSCULAR REEDUCATION: CPT

## 2022-11-07 PROCEDURE — 97110 THERAPEUTIC EXERCISES: CPT

## 2022-11-07 PROCEDURE — 97530 THERAPEUTIC ACTIVITIES: CPT

## 2022-11-07 NOTE — FLOWSHEET NOTE
100 [facility] a Department of [address]  [facility] [place] [facility], [address]  Office: [phone]  Fax:  [phone]             Date:  2022    Patient Name:  Galo Caputo    :  1990  MRN: [id number]  Medical Diagnosis:  Chronic midline low back pain with left-sided sciatica [M54.42, G89.29]  Treatment Diagnosis:    ICD-10-CM    1. Left-sided low back pain with left-sided sciatica, unspecified chronicity  M54.42       2. Weakness of left lower extremity  R29.898         Insurance/Certification information:  PT Insurance Information: [facility]  Physician Information:  KASSIDY Flores*    Plan of care signed (Y/N): []  Yes [x]  No  Date sent: 22    Date of Patient follow up with Physician:      Progress Report: []  Yes  [x]  No     Functional Scale:           Date assessed:  TO physical FS primary measure score = 41      10/25/22    Date Range for reporting period:  Beginnin22  Ending:  10/25/22    Progress report due (10 Rx/or 30 days whichever is less): 31     Recertification due (POC duration/ or 90 days whichever is less): 22     Visit # Insurance Allowable Auth required? Date Range   4 12 visits   22-22 [x]  Yes  []  No ???     Pain level:  5/10 with sx into LLE     SUBJECTIVE:  Pt continues to report 5/10 pain that increases fx after work when patient is resting at home. Pt has not noticed a significant change in symptoms, however, compliance has been limited d/t schedule. Sleeping has not been disturbed by pain over the past week.          OBJECTIVE: 25 Oct 2022  Observation:   Test measurements:    ROM       Lumbar Flexion 85  LBP, L lateral thigh pain   Lumbar Extension 25 Px down leg     LEFT RIGHT   Lumbar sidebend Restricted  L LE pain WFL   Lumbar Quadrant       Thoracic Rotation         LEFT RIGHT   HIP Flex ~100 [facility]   HIP Abd       HIP ER [facility] HIP IR 32 32   Knee Flex       Knee ext       Hamstring length (90/90) -30 -20   SLR P 80 A 45 px P 80 A 60   Piriformis length       Duglas Test               Strength  LEFT RIGHT   ALL NORMAL []        MfA       TrA       HIP Flexors (L1-2) 3+ 4-   HIP Abductors 3+ 3+   HIP extension 3+ 4-   Knee EXT (L3) 4 4+   Knee Flex (S1) 4- 4   Ankle DF (L4) 4- 4   Ankle PF (S2) 4 4   Great Toe Ext (L5) 4- 4   Dermatomes: L side dermatomes diminished   Reflexes: Unable to assess Achilles reflex due to guarding , patellar reflex WNL B  Repeated motions + for extension (peripheralization) - for flexion (centralization)      RESTRICTIONS/PRECAUTIONS: none    Treatment based classification: extension    Exercises/Interventions:       Exercise/Equipment Resistance/Repetitions Other comments   Hamstring stretch Seated propped 2x:20    Seated Sciatic Nerve glide  1x10 L LE  No slouch    Piriformis stretch Increased symptoms   Knee to chest Seated bilateral   Seated on heels low back stretch     Supine Pelvic tilts 1   Seated Pelvic tilts 10x B directions Seated on PB; difficulty with R pelvic tilt/trunk compensation    TrA contraction     TrA contraction with alt marches     Prone press ups    BS     B hip abd with TB Hooklying Green Tband   B 1x10   Unilateral 1x10 R/L  Unilateral weakness    Standing Hip ABD  2x10 R/L  Cueing to maintain appropriate trunk posture   Standing March  Airex; 1x10 R/L  Significant cueing for B hip drop    Left quadratus stretch seated    Seated lumbar flexion 3' Roll out on physio ball   Seated PB unilateral flexion  1x10 R/L 5 second hold to each side Roll out on PB              Low lumbar rotation     bridges     Cat/camel      bird/dog     Opposite UE to LE isometric core Hooklying with SB 10x:05  B UE together; no LE involvement    TrA/mutlifidi walk outs     TrA/multifidi push outs          Seated Thoracic ROT      Sidelying thoracic rotation          SB pikes     SB knee tucks     SB roll outs planks          Bike          Manual     PROM R Hip      Inf Joint Mobs, posterior mobs       STM- in L Sidelying     STM over low back in left sidelying     PA mobs Lumbar spine    Manual traction over ball 5' No changes in sx + or - discomfort    Left hip long axis distraction             Pt. Education:  -pt educated on diagnosis, prognosis and expectations for rehab  -all pt questions were answered    Home Exercise Program:  Access Code: [email or web address]  URL: [employer]/  Date: 10/25/2022  Prepared by: Salud Mac    Exercises  Supine Posterior Pelvic Tilt - 2 x daily - 7 x weekly - 3 sets - 10 reps  Supine Transversus Abdominis Bracing - Hands on Stomach - 2 x daily - 7 x weekly - 3 sets - 10 reps  Seated Lumbar Flexion Stretch - 2 x daily - 7 x weekly - 3 sets - 30 hold  Seated Posterior Pelvic Tilt - 2 x daily - 7 x weekly - 3 sets - 10 reps    11/7/22: SAME CODE: Valeria Tse, JIE   Hooklying Clamshell with Resistance - 1 x daily - 7 x weekly - 3 sets - 10 reps  Standing Hip Abduction with Counter Support - 1 x daily - 7 x weekly - 3 sets - 10 reps        Therapeutic Exercise and NMR EXR  [x] (75332) Provided verbal/tactile cueing for activities related to strengthening, flexibility, endurance, ROM  for improvements in proximal hip and core control with self care, mobility, lifting and ambulation. [x] (25245) Provided verbal/tactile cueing for activities related to improving balance, coordination, kinesthetic sense, posture, motor skill, proprioception  to assist with core control in self care, mobility, lifting, and ambulation.      Therapeutic Activities:    [x] (76485 or 07201) Provided verbal/tactile cueing for activities related to improving balance, coordination, kinesthetic sense, posture, motor skill, proprioception and motor activation to allow for proper function  with self care and ADLs  [x] (24647) Provided training and instruction to the patient for proper core and proximal hip recruitment and positioning with ambulation re-education     Home Exercise Program:    [x] (36103) Reviewed/Progressed HEP activities related to strengthening, flexibility, endurance, ROM of core, proximal hip and LE for functional self-care, mobility, lifting and ambulation   [x] (78893) Reviewed/Progressed HEP activities related to improving balance, coordination, kinesthetic sense, posture, motor skill, proprioception of core, proximal hip and LE for self care, mobility, lifting, and ambulation      Manual Treatments:  PROM / STM / Oscillations-Mobs:  G-I, II, III, IV (PA's, Inf., Post.)  [x] (38031) Provided manual therapy to mobilize proximal hip and LS spine soft tissue/joints for the purpose of modulating pain, promoting relaxation,  increasing ROM, reducing/eliminating soft tissue swelling/inflammation/restriction, improving soft tissue extensibility and allowing for proper ROM for normal function with self care, mobility, lifting and ambulation. Modalities:     Charges:   Timed Code Treatment Minutes: 52'   Total Treatment Minutes: 47'       [] EVAL (LOW) 99396 (typically 20 minutes face-to-face)  [] EVAL (MOD) 38417 (typically 30 minutes face-to-face)  [] EVAL (HIGH) 53163 (typically 45 minutes face-to-face)  [] RE-EVAL     [x] HR(59253) x  1  [] IONTO (03185)  [x] NMR (67015) x   1  [] VASO (07811)  [] Manual (32698) x     [] Other:  [x] TA (50631)x  1  [] Mech Traction (86637)  [] ES(attended) (26823)     [] ES (un) (79658):     GOALS:  Patient stated goal: reduce pain  [x] Progressing: [] Met: [] Not Met: [] Adjusted    Therapist goals for Patient:   Short Term Goals: To be achieved in: 2 weeks  1. Independent in HEP and progression per patient tolerance, in order to prevent re-injury. [] Progressing: [] Met: [] Not Met: [x] Adjusted: New exercise plan to be reassessed in future  2.  Patient will have a decrease in pain to <5/10 to facilitate improvement in movement, function, and ADLs as indicated by Functional Deficits. [x] Progressing: [] Met: [] Not Met: [] Adjusted    Long Term Goals: To be achieved in: 6-8 weeks  1. Patient will reach FOTO predicted score of at least 55 to assist with reaching prior level of function with activities such as sleeping. [x] Progressing: [] Met: [] Not Met: [] Adjusted  2. Patient will demonstrate increased AROM lumbar flexion and extension to WNL without radiating symptoms, good LS mobility, good hip ROM to allow for proper joint functioning as indicated by patients Functional Deficits. [x] Progressing: [] Met: [] Not Met: [] Adjusted  3. Patient will demonstrate an increase in Strength to at least 4/5 throughout and good proximal hip and core activation to allow for proper functional mobility as indicated by patients Functional Deficits. [] Progressing: [] Met: [x] Not Met: [] Adjusted   4. Patient will return to bending forward to care for her children without increased symptoms or restriction. [x] Progressing: [] Met: [] Not Met: [] Adjusted  5. Patient will return to standing for 30+ minutes without increased symptoms to allow patient to work with decreased pain. [] Progressing: [] Met: [x] Not Met: [] Adjusted       ASSESSMENT:      Treatment/Activity Tolerance:  [] Patient tolerated treatment well [] Patient limited by fatigue  [x] Patient limited by pain  [] Patient limited by other medical complications  [x] Other: Pt charbel tx fair. Pt required consistent observation and cueing to achieve desired repetitions and appropriate biomechanics with LE strengthening. Pt became unmotivated and disinterested throughout activities and clinician monitored sets and reps to try and keep patient engagement. Pt challenged to maintain appropriate trunk and pelvic postures during Wbing activities as bilateral glute strength and endurance deficits are present.  HEP updated to include proximal hip strengthening and pt provided with blue Tband for resistance to further improve outcomes and pt instructed to focus on mobility/stretches intermittently throughout work day and strengthening just 1x per day. Pt will benefit frm skilled PT that addresses flexion-based pattern, improves ROM, core strength, myotomal strength, and hip ROM to reduce pain and improve fxn. Overall Progression Towards Functional goals/ Treatment Progress Update:  [] Patient is progressing as expected towards functional goals listed. [] Progression is slowed due to complexities/Impairments listed. [] Progression has been slowed due to co-morbidities. [x] Plan just implemented, too soon to assess goals progression <30days   [] Goals require adjustment due to lack of progress  [] Patient is not progressing as expected and requires additional follow up with physician  [x] Other: Difficulty in previous schedules and appointments has limited her progress thus far. Proper dx and treatment approach should lead to improvements moving forward if pt compliance improves. Prognosis for POC: [x] Good [x] Fair  [] Poor    Patient requires continued skilled intervention: [x] Yes  [] No        PLAN: Consider more traction based tx NV. [x] Continue per plan of care [] Alter current plan (see comments)  [] Plan of care initiated [] Hold pending MD visit [] Discharge    Electronically signed by:     Suman Carroll, PTA [id number]      Note: If patient does not return for scheduled/recommended follow up visits, this note will serve as a discharge from care along with the most recent update on progress.

## 2022-11-09 ENCOUNTER — HOSPITAL ENCOUNTER (OUTPATIENT)
Dept: PHYSICAL THERAPY | Age: 32
Setting detail: THERAPIES SERIES
Discharge: HOME | End: 2022-11-09
Payer: COMMERCIAL

## 2022-11-09 PROCEDURE — 97112 NEUROMUSCULAR REEDUCATION: CPT | Performed by: PHYSICAL THERAPIST

## 2022-11-09 PROCEDURE — 97530 THERAPEUTIC ACTIVITIES: CPT | Performed by: PHYSICAL THERAPIST

## 2022-11-09 PROCEDURE — 97110 THERAPEUTIC EXERCISES: CPT | Performed by: PHYSICAL THERAPIST

## 2022-11-09 NOTE — FLOWSHEET NOTE
100 [facility] a Department of [address]  [address] [place] [place] [address], [address]  Office: [phone]  Fax:  [phone]             Date:  2022    Patient Name:  Rocco Murrell    :  1990  MRN: [id number]  Medical Diagnosis:  Chronic midline low back pain with left-sided sciatica [M54.42, G89.29]  Treatment Diagnosis:    ICD-10-CM    1. Left-sided low back pain with left-sided sciatica, unspecified chronicity  M54.42       2. Weakness of left lower extremity  R29.898         Insurance/Certification information:  PT Insurance Information: [facility]  Physician Information:  KASSIDY Isbell*    Plan of care signed (Y/N): []  Yes [x]  No  Date sent: 22    Date of Patient follow up with Physician:      Progress Report: []  Yes  [x]  No     Functional Scale:           Date assessed:  TO physical FS primary measure score = 41      10/25/22    Date Range for reporting period:  Beginnin22  Ending:  10/25/22    Progress report due (10 Rx/or 30 days whichever is less): 10/31/19     Recertification due (POC duration/ or 90 days whichever is less): 22     Visit # Insurance Allowable Auth required? Date Range   5 12 visits   22-22 [x]  Yes  []  No 22-22     Pain level:  5/10      SUBJECTIVE:  She hasn't been feeling a difference in her symptoms with her home exercises. She has noticed that the stretches have helped the symptoms from going into her legs. The pain gets worse when she is just standing in one spot.         OBJECTIVE: 25 Oct 2022  Observation:   Test measurements:    ROM       Lumbar Flexion 85  LBP, L lateral thigh pain   Lumbar Extension 25 Px down leg     LEFT RIGHT   Lumbar sidebend Restricted  L LE pain WFL   Lumbar Quadrant       Thoracic Rotation         LEFT RIGHT   HIP Flex ~100 WFL   HIP Abd       HIP ER [facility]   HIP IR 32 32   Knee Flex       Knee ext       Hamstring length (90/90) -30 -20   SLR P 80 A 45 px P 80 A 60   Piriformis length       Duglas Test               Strength  LEFT RIGHT   ALL NORMAL []        MfA       TrA       HIP Flexors (L1-2) 3+ 4-   HIP Abductors 3+ 3+   HIP extension 3+ 4-   Knee EXT (L3) 4 4+   Knee Flex (S1) 4- 4   Ankle DF (L4) 4- 4   Ankle PF (S2) 4 4   Great Toe Ext (L5) 4- 4   Dermatomes: L side dermatomes diminished   Reflexes: Unable to assess Achilles reflex due to guarding , patellar reflex WNL B  Repeated motions + for extension (peripheralization) - for flexion (centralization)      RESTRICTIONS/PRECAUTIONS: none    Treatment based classification: flexion    Exercises/Interventions:       Exercise/Equipment Resistance/Repetitions Other comments   Hamstring stretch    Sciatic Nerve glide  1x10 B LE  No slouch - supine 90/90   Piriformis stretch Increased symptoms   Knee to chest 2x:30 Supine DKTC   Seated on heels low back stretch     Supine Pelvic tilts 10x c ADIM- posterior only    Seated Pelvic tilts 10x B directions Seated on PB; difficulty with R pelvic tilt/trunk compensation    TrA contraction 2x5 Supine with march   TrA contraction with alt marches     Prone press ups    BS     B hip abd with TB Hooklying Green Tband   B 2x10   Unilateral weakness    Standing Hip ABD  1x10 R/L  Cueing to maintain appropriate trunk posture   Standing March  ; 1x10 R/L  Significant cueing for B hip drop    Left quadratus stretch seated    Seated lumbar flexion 5' Roll out on physio ball, F/B,L/R   Seated PB unilateral flexion            Low lumbar rotation     bridges     Cat/camel      bird/dog     Opposite UE to LE isometric core Hooklying with SB 10x:05  B UE together; no LE involvement    TrA/mutlifidi walk outs     TrA/multifidi push outs          Seated Thoracic ROT      Sidelying thoracic rotation          SB pikes     SB knee tucks     SB roll outs     planks          Bike          Manual     PROM R Hip      Inf Joint Mobs, posterior mobs       STM- in L Sidelying     STM over low back in left sidelying     PA mobs Lumbar spine    Manual traction over ball 5' No changes in sx + or - discomfort    Left hip long axis distraction             Pt. Education:  -pt educated on diagnosis, prognosis and expectations for rehab  -all pt questions were answered    Home Exercise Program:  Access Code: [email or web address]  URL: [employer]/  Date: 11/09/2022  Prepared by: Ann Dubois Cessna    Exercises  Supine Posterior Pelvic Tilt - 2 x daily - 7 x weekly - 3 sets - 10 reps  Supine Transversus Abdominis Bracing - Hands on Stomach - 2 x daily - 7 x weekly - 3 sets - 10 reps  Seated Lumbar Flexion Stretch - 2 x daily - 7 x weekly - 3 sets - 30 hold  Seated Posterior Pelvic Tilt - 2 x daily - 7 x weekly - 3 sets - 10 reps  Hooklying Clamshell with Resistance - 1 x daily - 7 x weekly - 3 sets - 10 reps  Standing Hip Abduction with Counter Support - 1 x daily - 7 x weekly - 3 sets - 10 reps  Supine March - 1 x daily - 7 x weekly - 3 sets - 10 reps    11/7/22: SAME CODE: Anahy Skinner, PTA   Hooklying Clamshell with Resistance - 1 x daily - 7 x weekly - 3 sets - 10 reps  Standing Hip Abduction with Counter Support - 1 x daily - 7 x weekly - 3 sets - 10 reps        Therapeutic Exercise and NMR EXR  [x] (17789) Provided verbal/tactile cueing for activities related to strengthening, flexibility, endurance, ROM  for improvements in proximal hip and core control with self care, mobility, lifting and ambulation. [x] (51861) Provided verbal/tactile cueing for activities related to improving balance, coordination, kinesthetic sense, posture, motor skill, proprioception  to assist with core control in self care, mobility, lifting, and ambulation.      Therapeutic Activities:    [x] (15672 or 41722) Provided verbal/tactile cueing for activities related to improving balance, coordination, kinesthetic sense, posture, motor skill, proprioception and motor activation to allow for proper function  with self care and ADLs  [x] (72378) Provided training and instruction to the patient for proper core and proximal hip recruitment and positioning with ambulation re-education     Home Exercise Program:    [x] (32726) Reviewed/Progressed HEP activities related to strengthening, flexibility, endurance, ROM of core, proximal hip and LE for functional self-care, mobility, lifting and ambulation   [x] (23924) Reviewed/Progressed HEP activities related to improving balance, coordination, kinesthetic sense, posture, motor skill, proprioception of core, proximal hip and LE for self care, mobility, lifting, and ambulation      Manual Treatments:  PROM / STM / Oscillations-Mobs:  G-I, II, III, IV (PA's, Inf., Post.)  [x] (00294) Provided manual therapy to mobilize proximal hip and LS spine soft tissue/joints for the purpose of modulating pain, promoting relaxation,  increasing ROM, reducing/eliminating soft tissue swelling/inflammation/restriction, improving soft tissue extensibility and allowing for proper ROM for normal function with self care, mobility, lifting and ambulation. Modalities:     Charges:   Timed Code Treatment Minutes: 39'   Total Treatment Minutes: 54'       [] EVAL (LOW) 455 1011 (typically 20 minutes face-to-face)  [] EVAL (MOD) 04731 (typically 30 minutes face-to-face)  [] EVAL (HIGH) 55163 (typically 45 minutes face-to-face)  [] RE-EVAL     [x] RUCKER(96295) x 1   [] IONTO (97672)  [x] NMR (80941) x 1   [] VASO (91363)  [] Manual (30380) x     [] Other:  [x] TA (70795)x 1   [] [facility] Traction (83163)  [] ES(attended) (05830)     [] ES (un) (61811):     GOALS:  Patient stated goal: reduce pain  [x] Progressing: [] Met: [] Not Met: [] Adjusted    Therapist goals for Patient:   Short Term Goals: To be achieved in: 2 weeks  1. Independent in HEP and progression per patient tolerance, in order to prevent re-injury.    [] Progressing: [] Met: [] Not Met: [x] Adjusted: New exercise plan to be reassessed in future  2. Patient will have a decrease in pain to <5/10 to facilitate improvement in movement, function, and ADLs as indicated by Functional Deficits. [x] Progressing: [] Met: [] Not Met: [] Adjusted    Long Term Goals: To be achieved in: 6-8 weeks  1. Patient will reach FOTO predicted score of at least 55 to assist with reaching prior level of function with activities such as sleeping. [x] Progressing: [] Met: [] Not Met: [] Adjusted  2. Patient will demonstrate increased AROM lumbar flexion and extension to WNL without radiating symptoms, good LS mobility, good hip ROM to allow for proper joint functioning as indicated by patients Functional Deficits. [x] Progressing: [] Met: [] Not Met: [] Adjusted  3. Patient will demonstrate an increase in Strength to at least 4/5 throughout and good proximal hip and core activation to allow for proper functional mobility as indicated by patients Functional Deficits. [] Progressing: [] Met: [x] Not Met: [] Adjusted   4. Patient will return to bending forward to care for her children without increased symptoms or restriction. [x] Progressing: [] Met: [] Not Met: [] Adjusted  5. Patient will return to standing for 30+ minutes without increased symptoms to allow patient to work with decreased pain. [] Progressing: [] Met: [x] Not Met: [] Adjusted       ASSESSMENT:      Treatment/Activity Tolerance:  [] Patient tolerated treatment well [x] Patient limited by fatigue  [x] Patient limited by pain  [] Patient limited by other medical complications  [x] Other: Pt charbel tx fair. She noted discomfort with most exercises but seemed to have a difficult time determining muscle activation versus actual pain. She seemed very fatigued and it was only a few repetitions before her form started to deteriorate. She displayed generalized weakness and difficulty with proprioception when standing.  She requires aggressive strengthening of her hips and core but currently is not tolerant of this. There are a few flexion-based exercises that seem to help her symptoms and should be utilized throughout her day, but feedback on exercise tolerance was limited. She will continue to benefit from PT education, flexion-based exercise, core strength, and hip strength to decrease pain, improve functional activity tolerance, and progress to PLOF. Overall Progression Towards Functional goals/ Treatment Progress Update:  [] Patient is progressing as expected towards functional goals listed. [] Progression is slowed due to complexities/Impairments listed. [] Progression has been slowed due to co-morbidities. [x] Plan just implemented, too soon to assess goals progression <30days   [] Goals require adjustment due to lack of progress  [] Patient is not progressing as expected and requires additional follow up with physician  [x] Other: Difficulty in previous schedules and appointments has limited her progress thus far. Proper dx and treatment approach should lead to improvements moving forward if pt compliance improves. Prognosis for POC: [x] Good [x] Fair  [] Poor    Patient requires continued skilled intervention: [x] Yes  [] No        PLAN: Consider more traction based tx NV. [x] Continue per plan of care [] Alter current plan (see comments)  [] Plan of care initiated [] Hold pending MD visit [] Discharge    Electronically signed by:   Zee Longoria, Student Physical Therapist  Therapist was present, directed the patient's care, made skilled judgement, and was responsible for assessment and treatment of the patient. Jordan Hirsch, PT, DPT, Board-Certified Specialist in [facility]         Note: If patient does not return for scheduled/recommended follow up visits, this note will serve as a discharge from care along with the most recent update on progress.

## 2022-11-16 ENCOUNTER — HOSPITAL ENCOUNTER (OUTPATIENT)
Dept: PHYSICAL THERAPY | Age: 32
Setting detail: THERAPIES SERIES
Discharge: HOME | End: 2022-11-16
Payer: COMMERCIAL

## 2022-11-16 NOTE — FLOWSHEET NOTE
[facility] a Department of [address]  Kia Fong [address], [address]  Office: [phone]  Fax:  [phone]  _________________________________________________________________    Physical Therapy  Cancellation/No-show Note  Patient Name:  Gabriella Bacon  :  1990   Date:  2022  Cancelled visits to date: 2  No-shows to date: 2    For today's appointment patient:  []  Cancelled  []  Rescheduled appointment  [x]  No-show     Reason given by patient:  []  Patient ill  []  Conflicting appointment  []  No transportation    []  Conflict with work  []  No reason given  [x]  Other:     Comments:  Pt no showed her appointment. She was called but call went to voicemail and mailbox was full. If patient does not return, this note can be considered a discharge note. Pt has now come 5x and canceled/no-showed 4x. Electronically signed by:    Celia Reaves, Student Physical Therapist  Therapist was present, directed the patient's care, made skilled judgement, and was responsible for assessment and treatment of the patient.       Corin Gonzalez, PT, PT, DPT, Board-Certified Specialist in [facility] 74

## 2022-11-23 ENCOUNTER — HOSPITAL ENCOUNTER (OUTPATIENT)
Dept: PHYSICAL THERAPY | Age: 32
Setting detail: THERAPIES SERIES
Discharge: HOME | End: 2022-11-23
Payer: COMMERCIAL

## 2022-11-23 NOTE — FLOWSHEET NOTE
[address] [facility] a Department of [address]  Noe Rocha [place] [address], [address]  Office: [phone]  Fax:  [phone]  _________________________________________________________________    Physical Therapy  Cancellation/No-show Note  Patient Name:  Lili Villegas  :  1990   Date:  2022  Cancelled visits to date: 2  No-shows to date: 3    For today's appointment patient:  []  Cancelled  []  Rescheduled appointment  [x]  No-show     Reason given by patient:  []  Patient ill  []  Conflicting appointment  []  No transportation    []  Conflict with work  []  No reason given  [x]  Other:     Comments:  Pt no showed her appointment and will be D/C'd at this date as pt has now cx/no-showed 5x and attended 5x. Per [facility]'s attendance policy, patient will be removed from the schedule.        Electronically signed by:         Fili Snyder PTA, [id number]

## 2022-11-30 ENCOUNTER — APPOINTMENT (OUTPATIENT)
Dept: PHYSICAL THERAPY | Age: 32
End: 2022-11-30
Payer: COMMERCIAL

## 2023-01-11 ENCOUNTER — HOSPITAL ENCOUNTER (EMERGENCY)
Age: 33
Discharge: HOME | End: 2023-01-11
Attending: EMERGENCY MEDICINE
Payer: COMMERCIAL

## 2023-01-11 ENCOUNTER — APPOINTMENT (OUTPATIENT)
Dept: GENERAL RADIOLOGY | Age: 33
End: 2023-01-11
Payer: COMMERCIAL

## 2023-01-11 ENCOUNTER — APPOINTMENT (OUTPATIENT)
Dept: CT IMAGING | Age: 33
End: 2023-01-11
Payer: COMMERCIAL

## 2023-01-11 ENCOUNTER — APPOINTMENT (OUTPATIENT)
Dept: ULTRASOUND IMAGING | Age: 33
End: 2023-01-11
Payer: COMMERCIAL

## 2023-01-11 VITALS
OXYGEN SATURATION: 100 % | HEART RATE: 84 BPM | TEMPERATURE: 98.1 F | WEIGHT: 82.5 LBS | DIASTOLIC BLOOD PRESSURE: 68 MMHG | BODY MASS INDEX: 15.09 KG/M2 | SYSTOLIC BLOOD PRESSURE: 112 MMHG | RESPIRATION RATE: 16 BRPM

## 2023-01-11 DIAGNOSIS — Z34.90 INTRAUTERINE PREGNANCY: Primary | ICD-10-CM

## 2023-01-11 DIAGNOSIS — Z98.51 HISTORY OF TUBAL LIGATION: ICD-10-CM

## 2023-01-11 DIAGNOSIS — R11.2 NAUSEA AND VOMITING, UNSPECIFIED VOMITING TYPE: ICD-10-CM

## 2023-01-11 LAB
A/G RATIO: 1.2 (ref 1.1–2.2)
ALBUMIN SERPL-MCNC: 4.3 G/DL (ref 3.4–5)
ALP BLD-CCNC: 64 U/L (ref 40–129)
ALT SERPL-CCNC: 8 U/L (ref 10–40)
ANION GAP SERPL CALCULATED.3IONS-SCNC: 12 MMOL/L (ref 3–16)
AST SERPL-CCNC: 39 U/L (ref 15–37)
BACTERIA: ABNORMAL /HPF
BASOPHILS ABSOLUTE: 0 K/UL (ref 0–0.2)
BASOPHILS RELATIVE PERCENT: 0.4 %
BILIRUB SERPL-MCNC: 0.4 MG/DL (ref 0–1)
BILIRUBIN URINE: NEGATIVE
BLOOD, URINE: NEGATIVE
BUN BLDV-MCNC: 10 MG/DL (ref 7–20)
CALCIUM SERPL-MCNC: 9.3 MG/DL (ref 8.3–10.6)
CHLORIDE BLD-SCNC: 102 MMOL/L (ref 99–110)
CLARITY: CLEAR
CO2: 21 MMOL/L (ref 21–32)
COLOR: ABNORMAL
CREAT SERPL-MCNC: <0.5 MG/DL (ref 0.6–1.1)
EOSINOPHILS ABSOLUTE: 0 K/UL (ref 0–0.6)
EOSINOPHILS RELATIVE PERCENT: 0.2 %
EPITHELIAL CELLS, UA: 3 /HPF (ref 0–5)
GFR SERPL CREATININE-BSD FRML MDRD: >60 ML/MIN/{1.73_M2}
GLUCOSE BLD-MCNC: 97 MG/DL (ref 70–99)
GLUCOSE URINE: NEGATIVE MG/DL
GONADOTROPIN, CHORIONIC (HCG) QUANT: NORMAL MIU/ML
HCG QUALITATIVE: POSITIVE
HCT VFR BLD CALC: 34.3 % (ref 36–48)
HEMOGLOBIN: 10.7 G/DL (ref 12–16)
HYALINE CASTS: 0 /LPF (ref 0–8)
KETONES, URINE: 80 MG/DL
LEUKOCYTE ESTERASE, URINE: NEGATIVE
LIPASE: 11 U/L (ref 13–60)
LYMPHOCYTES ABSOLUTE: 1.5 K/UL (ref 1–5.1)
LYMPHOCYTES RELATIVE PERCENT: 20.1 %
MCH RBC QN AUTO: 22.8 PG (ref 26–34)
MCHC RBC AUTO-ENTMCNC: 31.3 G/DL (ref 31–36)
MCV RBC AUTO: 72.9 FL (ref 80–100)
MICROSCOPIC EXAMINATION: YES
MONOCYTES ABSOLUTE: 1 K/UL (ref 0–1.3)
MONOCYTES RELATIVE PERCENT: 13.4 %
MUCUS: PRESENT
NEUTROPHILS ABSOLUTE: 4.8 K/UL (ref 1.7–7.7)
NEUTROPHILS RELATIVE PERCENT: 65.9 %
NITRITE, URINE: NEGATIVE
PDW BLD-RTO: 19.4 % (ref 12.4–15.4)
PH UA: 6 (ref 5–8)
PLATELET # BLD: 205 K/UL (ref 135–450)
PMV BLD AUTO: 8 FL (ref 5–10.5)
POTASSIUM SERPL-SCNC: 3.6 MMOL/L (ref 3.5–5.1)
PROTEIN UA: 30 MG/DL
RAPID INFLUENZA  B AGN: NEGATIVE
RAPID INFLUENZA A AGN: NEGATIVE
RBC # BLD: 4.71 M/UL (ref 4–5.2)
RBC UA: 7 /HPF (ref 0–4)
SARS-COV-2, NAAT: NOT DETECTED
SODIUM BLD-SCNC: 135 MMOL/L (ref 136–145)
SPECIFIC GRAVITY UA: 1.03 (ref 1–1.03)
TOTAL PROTEIN: 7.8 G/DL (ref 6.4–8.2)
URINE REFLEX TO CULTURE: ABNORMAL
URINE TYPE: ABNORMAL
UROBILINOGEN, URINE: 1 E.U./DL
WBC # BLD: 7.3 K/UL (ref 4–11)
WBC UA: 1 /HPF (ref 0–5)

## 2023-01-11 PROCEDURE — 85025 COMPLETE CBC W/AUTO DIFF WBC: CPT

## 2023-01-11 PROCEDURE — 99284 EMERGENCY DEPT VISIT MOD MDM: CPT

## 2023-01-11 PROCEDURE — 6360000002 HC RX W HCPCS: Performed by: PHYSICIAN ASSISTANT

## 2023-01-11 PROCEDURE — 83690 ASSAY OF LIPASE: CPT

## 2023-01-11 PROCEDURE — 80053 COMPREHEN METABOLIC PANEL: CPT

## 2023-01-11 PROCEDURE — 96374 THER/PROPH/DIAG INJ IV PUSH: CPT

## 2023-01-11 PROCEDURE — 84703 CHORIONIC GONADOTROPIN ASSAY: CPT

## 2023-01-11 PROCEDURE — 87635 SARS-COV-2 COVID-19 AMP PRB: CPT

## 2023-01-11 PROCEDURE — 2580000003 HC RX 258: Performed by: PHYSICIAN ASSISTANT

## 2023-01-11 PROCEDURE — 71046 X-RAY EXAM CHEST 2 VIEWS: CPT

## 2023-01-11 PROCEDURE — 84702 CHORIONIC GONADOTROPIN TEST: CPT

## 2023-01-11 PROCEDURE — 93005 ELECTROCARDIOGRAM TRACING: CPT | Performed by: EMERGENCY MEDICINE

## 2023-01-11 PROCEDURE — 76817 TRANSVAGINAL US OBSTETRIC: CPT

## 2023-01-11 PROCEDURE — 81001 URINALYSIS AUTO W/SCOPE: CPT

## 2023-01-11 PROCEDURE — 87804 INFLUENZA ASSAY W/OPTIC: CPT

## 2023-01-11 RX ORDER — SODIUM CHLORIDE, SODIUM LACTATE, POTASSIUM CHLORIDE, CALCIUM CHLORIDE 600; 310; 30; 20 MG/100ML; MG/100ML; MG/100ML; MG/100ML
INJECTION, SOLUTION INTRAVENOUS ONCE
Status: COMPLETED | OUTPATIENT
Start: 2023-01-11 | End: 2023-01-11

## 2023-01-11 RX ORDER — METOCLOPRAMIDE HYDROCHLORIDE 5 MG/ML
5 INJECTION INTRAMUSCULAR; INTRAVENOUS ONCE
Status: COMPLETED | OUTPATIENT
Start: 2023-01-11 | End: 2023-01-11

## 2023-01-11 RX ORDER — METOCLOPRAMIDE 10 MG/1
10 TABLET ORAL 4 TIMES DAILY
Qty: 12 TABLET | Refills: 0 | Status: SHIPPED | OUTPATIENT
Start: 2023-01-11 | End: 2023-01-14

## 2023-01-11 RX ORDER — DIPHENHYDRAMINE HYDROCHLORIDE 50 MG/ML
12.5 INJECTION INTRAMUSCULAR; INTRAVENOUS ONCE
Status: DISCONTINUED | OUTPATIENT
Start: 2023-01-11 | End: 2023-01-12 | Stop reason: HOSPADM

## 2023-01-11 RX ORDER — PNV NO.95/FERROUS FUM/FOLIC AC 28MG-0.8MG
1 TABLET ORAL DAILY
Qty: 30 TABLET | Refills: 0 | Status: SHIPPED | OUTPATIENT
Start: 2023-01-11

## 2023-01-11 RX ORDER — 0.9 % SODIUM CHLORIDE 0.9 %
1000 INTRAVENOUS SOLUTION INTRAVENOUS ONCE
Status: COMPLETED | OUTPATIENT
Start: 2023-01-11 | End: 2023-01-11

## 2023-01-11 RX ADMIN — SODIUM CHLORIDE 1000 ML: 9 INJECTION, SOLUTION INTRAVENOUS at 19:37

## 2023-01-11 RX ADMIN — SODIUM CHLORIDE, POTASSIUM CHLORIDE, SODIUM LACTATE AND CALCIUM CHLORIDE: 600; 310; 30; 20 INJECTION, SOLUTION INTRAVENOUS at 19:38

## 2023-01-11 RX ADMIN — METOCLOPRAMIDE 5 MG: 5 INJECTION, SOLUTION INTRAMUSCULAR; INTRAVENOUS at 19:35

## 2023-01-11 ASSESSMENT — ENCOUNTER SYMPTOMS
BLOOD IN STOOL: 0
COUGH: 1
ABDOMINAL PAIN: 1
VOMITING: 1
COLOR CHANGE: 0
CONSTIPATION: 0
NAUSEA: 1

## 2023-01-11 ASSESSMENT — PAIN SCALES - GENERAL: PAINLEVEL_OUTOF10: 9

## 2023-01-11 ASSESSMENT — PAIN - FUNCTIONAL ASSESSMENT
PAIN_FUNCTIONAL_ASSESSMENT: NONE - DENIES PAIN
PAIN_FUNCTIONAL_ASSESSMENT: 0-10

## 2023-01-11 NOTE — ED PROVIDER NOTES
[address]        Pt Name: Virgilay Apley  MRN: [id number]  [place] 1990  Date of evaluation: 2023  Provider: ANNA Sawyer  PCP: KASSIDY Moreland CNP  Note Started: 6:51 PM EST 23    This patient with my attending physician Dr. Diony Sawyer. CHIEF COMPLAINT       Chief Complaint   Patient presents with    Abdominal Pain     Pt reports abd pain, n/v and inability to tolerate PO. Pt reports cough and chills as well. No sick contacts. HISTORY OF PRESENT ILLNESS: 1 or more Elements     History from : Patient    Limitations to history : None     Apley is a 33 y.o. female who presents complaining of abdominal pain. She complains of mid abdominal pain. Its associated with nausea and vomiting. She had a little bit of yellow stool today but not necessarily any diarrhea no bloody stool no bloody emesis. Symptoms started on Monday. She had some subjective chills and a cough with some white foam.  No known sick contacts. Multiple prior abdominal surgeries including cholecystectomy, gastric sleeve, , hernia repair and tubal ligation. She denies urinary symptoms or vaginal discharge. She tells me she has not really been able to keep anything down for the past couple of days and now she is getting lightheaded when she tries to stand up. She also admittedly is not taking the iron she has been prescribed in the past for anemia. Abdominal pain 9 out of 10. Denies regular heavy alcohol use. Does not smoke cigarettes. She did not receive the COVID vaccination or flu vaccination. Nursing Notes were all reviewed and agreed with or any disagreements were addressed in the HPI. REVIEW OF SYSTEMS :      Review of Systems   Constitutional:  Positive for appetite change, chills and fatigue. Negative for fever. Respiratory:  Positive for cough. Cardiovascular:  Negative for chest pain.   Gastrointestinal:  Positive for abdominal pain, nausea and vomiting. Negative for blood in stool and constipation.   Genitourinary:  Negative for dysuria.   Skin:  Negative for color change and wound.   Neurological:  Negative for numbness.   Psychiatric/Behavioral:  Negative for agitation, behavioral problems and confusion.      Positives and Pertinent negatives as per HPI.     SURGICAL HISTORY     Past Surgical History:   Procedure Laterality Date    ABDOMEN SURGERY      GASTRIC SLEEVE     SECTION      CHOLECYSTECTOMY      LAPROSCOPIC    HERNIA REPAIR      OTHER SURGICAL HISTORY N/A 2016    EGD, Placement of esophageal stent    TUBAL LIGATION         CURRENTMEDICATIONS       Previous Medications    FERROUS SULFATE (IRON 325) 325 (65 FE) MG TABLET    Take 1 tablet by mouth 2 times daily    LANSOPRAZOLE (PREVACID SOLUTAB) 30 MG DISINTEGRATING TABLET    Take by mouth daily    METHYLPREDNISOLONE (MEDROL DOSEPACK) 4 MG TABLET    Take by mouth.       ALLERGIES     Patient has no known allergies.    FAMILYHISTORY       Family History   Problem Relation Age of Onset    No Known Problems Mother     Hypertension Father     No Known Problems Brother     No Known Problems Brother     No Known Problems Brother     No Known Problems Brother     Stroke Maternal Grandmother     Heart Attack Paternal Grandfather         SOCIAL HISTORY       Social History     Tobacco Use    Smoking status: Former     Packs/day: 0.20     Types: Cigarettes    Smokeless tobacco: Never   Vaping Use    Vaping Use: Never used   Substance Use Topics    Alcohol use: Yes     Alcohol/week: 1.0 standard drink     Types: 1 Standard drinks or equivalent per week    Drug use: Yes     Types: Marijuana (Weed)     Comment: daily       SCREENINGS                         CIWA Assessment  BP: 108/66  Heart Rate: 84           PHYSICAL EXAM  1 or more Elements     ED Triage Vitals [23 1848]   BP Temp Temp Source Heart Rate Resp SpO2 Height Weight  124/64 98.1 °F (36.7 °C) Oral 81 -- 100 % -- --       Physical Exam  Vitals and nursing note reviewed. Constitutional:       Appearance: She is well-developed. HENT:      Head: Normocephalic and atraumatic. Cardiovascular:      Rate and Rhythm: Normal rate. Pulmonary:      Effort: Pulmonary effort is normal. No respiratory distress. Abdominal:      Tenderness: There is generalized abdominal tenderness. There is no guarding or rebound. Skin:     General: Skin is warm. Neurological:      General: No focal deficit present. Mental Status: She is alert and oriented to person, place, and time. Psychiatric:         Mood and Affect: Mood normal.         Behavior: Behavior normal.       DIAGNOSTIC RESULTS   LABS:    Labs Reviewed   CBC WITH AUTO DIFFERENTIAL - Abnormal; Notable for the following components:       Result Value    Hemoglobin 10.7 (*)     Hematocrit 34.3 (*)     MCV 72.9 (*)     MCH 22.8 (*)     RDW 19.4 (*)     All other components within normal limits   COMPREHENSIVE METABOLIC PANEL - Abnormal; Notable for the following components:    Sodium 135 (*)     Creatinine <0.5 (*)     ALT 8 (*)     AST 39 (*)     All other components within normal limits   LIPASE - Abnormal; Notable for the following components:    Lipase 11.0 (*)     All other components within normal limits   URINALYSIS WITH REFLEX TO CULTURE - Abnormal; Notable for the following components:    Color, UA DARK YELLOW (*)     Ketones, Urine 80 (*)     Protein, UA 30 (*)     All other components within normal limits   MICROSCOPIC URINALYSIS - Abnormal; Notable for the following components:    RBC, UA 7 (*)     Mucus, UA Present (*)     All other components within normal limits   COVID-19, RAPID   RAPID INFLUENZA A/B ANTIGENS   HCG, SERUM, QUALITATIVE   HCG, QUANTITATIVE, PREGNANCY       When ordered only abnormal lab results are displayed. All other labs were within normal range or not returned as of this dictation. EKG:  When ordered, EKG's are interpreted by the Emergency Department Physician in the absence of a cardiologist.  Please see their note for interpretation of EKG. RADIOLOGY:   Non-plain film images such as CT, Ultrasound and MRI are read by the radiologist. Plain radiographic images are visualized and preliminarily interpreted by the ED Provider with the below findings:    Interpretation per the Radiologist below, if available at the time of this note:    US OB TRANSVAGINAL   Preliminary Result   Single live intrauterine pregnancy with estimated gestational age by   ultrasound of 6 weeks, 3 days. This corresponds to a due date of 09/03/2023. Fetal heart rate is 114 beats per minute. Arterial and venous color Doppler flow is seen in both ovaries. 1.8 cm right ovarian cyst.  This is likely a simple cyst, allowing for   artifact. XR CHEST (2 VW)   Final Result   No acute cardiopulmonary abnormality. No results found. No results found. PROCEDURES   Unless otherwise noted below, none     Procedures    CRITICAL CARE TIME (.cctime)   I performed a total Critical Care time of 0 minutes, excluding separately reportable procedures. There was a high probability of clinically significant/life threatening deterioration in the patient's condition which required my urgent intervention. Not limited to multiple reexaminations, discussions with attending physician and consultants. PAST MEDICAL HISTORY      has a past medical history of Dehydration and Nausea & vomiting.      EMERGENCY DEPARTMENT COURSE and DIFFERENTIAL DIAGNOSIS/MDM:   Vitals:    Vitals:    01/11/23 1946 01/11/23 2054 01/11/23 2126 01/11/23 2127   BP:   119/74 108/66   Pulse: 80 90 84    Resp: 18 18 18    Temp:       TempSrc:       SpO2: 99% 98% 100% 100%   Weight:           Patient was given the following medications:  Medications   diphenhydrAMINE (BENADRYL) injection 12.5 mg (12.5 mg IntraVENous Not Given 1/11/23 1935)   0.9 % sodium chloride bolus (0 mLs IntraVENous Stopped 1/11/23 2040)   metoclopramide (REGLAN) injection 5 mg (5 mg IntraVENous Given 1/11/23 1935)   lactated ringers infusion ( IntraVENous New Bag 1/11/23 1938)             Is this patient to be included in the SEP-1 Core Measure due to severe sepsis or septic shock? No   Exclusion criteria - the patient is NOT to be included for SEP-1 Core Measure due to: Infection is not suspected    CONSULTS: (Who and What was discussed)  None  Discussion with Other Profesionals : None    Social Determinants : None    Records Reviewed : None-    CC/HPI Summary, DDx, ED Course, and Reassessment: Afebrile not tachycardic not hypoxic. Blood pressure 198 systolic. The patient has had some nausea and vomiting for couple of days. She reported some abdominal pain on exam she has no tenderness specifically no lower abdominal tenderness. She has had a cough and some chills felt fatigued. She had a tubal ligation almost exactly 2 years ago. Has remained sexually active. Her menstrual periods are not regular she had her last around 1 December. She is pregnant. She has evidence of an intrauterine pregnancy. Discussed with the patient the importance of taking prenatal vitamins and following with OB/GYN. Return for new, worsening or other concerns. She is not bleeding. I discussed with Sunday Apley and/or family the exam results, diagnosis, care, prognosis, reasons to return and the importance of follow up. Patient and/or family is in full agreement with plan and all questions have been answered. Specific discharge instructions explained, including reasons to return to the emergency department. Sunday Apley is well appearing, non-toxic, and afebrile at the time of discharge.      I estimate there is LOW risk for ACUTE APPENDICITIS, BOWEL OBSTRUCTION, CHOLECYSTITIS, DIVERTICULITIS, INCARCERATED HERNIA, PANCREATITIS, PELVIC INFLAMMATORY DISEASE, OVARIAN TORSION, PERFORATED BOWEL,  BOWEL ISCHEMIA, CARDIAC ISCHEMIA, ECTOPIC PREGNANCY, or TUBO-OVARIAN ABSCESS, thus I consider the discharge disposition reasonable. Also, there is no evidence or peritonitis, sepsis, or toxicity. I am the Primary Clinician of Record. FINAL IMPRESSION      1. Intrauterine pregnancy    2. Nausea and vomiting, unspecified vomiting type    3. History of tubal ligation          DISPOSITION/PLAN     DISPOSITION Decision To Discharge 01/11/2023 10:04:00 PM      PATIENT REFERRED TO:  [facility]  [place] [address]  [phone]  Call in 1 day  For follow up with OBGYN    DISCHARGE MEDICATIONS:  New Prescriptions    METOCLOPRAMIDE (REGLAN) 10 MG TABLET    Take 1 tablet by mouth 4 times daily for 12 doses    PRENATAL VIT-FE FUMARATE-FA (PRENATAL VITAMIN) 27-0.8 MG TABS    Take 1 tablet by mouth daily       DISCONTINUED MEDICATIONS:  Discontinued Medications    No medications on file              (Please note that portions of this note were completed with a voice recognition program.  Efforts were made to edit the dictations but occasionally words are mis-transcribed.)    Sal Rockwell (electronically signed)            Sal Rockwell  01/11/23 7181

## 2023-01-12 LAB
EKG ATRIAL RATE: 76 BPM
EKG DIAGNOSIS: NORMAL
EKG P AXIS: 48 DEGREES
EKG P-R INTERVAL: 144 MS
EKG Q-T INTERVAL: 388 MS
EKG QRS DURATION: 76 MS
EKG QTC CALCULATION (BAZETT): 436 MS
EKG R AXIS: 2 DEGREES
EKG T AXIS: -21 DEGREES
EKG VENTRICULAR RATE: 76 BPM

## 2023-01-12 PROCEDURE — 93010 ELECTROCARDIOGRAM REPORT: CPT | Performed by: INTERNAL MEDICINE

## 2023-01-12 NOTE — ED PROVIDER NOTES
I have personally performed a face to face diagnostic evaluation on this patient. I have fully participated in the care of this patient I personally saw the patient and performed a substantive portion of the visit including all aspects of the medical decision making. I have reviewed and agree with all pertinent clinical information including history, physical exam, diagnostic tests, and the plan. HPI: Irene Henriquez presented with generalized abdominal pain worse in the right side. Nausea vomiting inability to tolerate p.o. Associate with cough and chills. Patient's last menstrual period was greater than 2 months prior however patient has a history of a tubal ligation. No vaginal bleeding. She STACY note for further details. Chief Complaint   Patient presents with    Abdominal Pain     Pt reports abd pain, n/v and inability to tolerate PO. Pt reports cough and chills as well. No sick contacts. Review of Systems: See STACY note  Vital Signs: /66   Pulse 84   Temp 98.1 °F (36.7 °C) (Oral)   Resp 18   Wt 82 lb 8 oz (37.4 kg)   LMP 12/01/2022   SpO2 100%   BMI 15.09 kg/m²     Alert 33 y.o. female who does not appear toxic or acutely ill  HENT: Atraumatic, oral mucosa moist  Neck: Grossly normal ROM  Chest/Lungs: respiratory effort normal   Musculoskeletal: Grossly normal ROM  Skin: No palor or diaphoresis    Medical Decision Making and Plan:  Pertinent Labs & Imaging studies reviewed. (See STACY chart for details)  I agree with STACY assessment and plan. 71-year-old female with abdominal pain. History of tubal ligation however patient is pregnant. Bedside ultrasound without signs of intrauterine pregnancy but formal ultrasound shows intrauterine pregnancy. Quantitative hCG greater than 72,000. Vital signs are stable. Patient is approximately 6 weeks pregnant. Will give patient follow-up with OB/GYN. STACY note for further details.     I personally saw the patient and independently provided 0 minutes of nonconcurrent critical care out of the total shared critical care time provided    Point of care pregnancy exam  Procedure note:  Indication: qualitative hCG positive, quantitative hCG positive    Views:  Transabdominal sagittal: Adequate  Transabdominal transverse: Adequate    Images obtained with findings:   Intrauterine pregnancy: Unclear patient has gestational sac without yolk sac but possible fetal pole  Yolk sac: Absent   Fetal pole: Possible  Fetal heart rate:   N/A  Fetal motion: Absent     Impression:   Live intrauterine pregnancy: None for possible intrauterine pregnancy but not confirmed at this time patient will require formal ultrasound     Images obtained by self, interpreted by self. Images were saved to ultrasound machine.        Dameon Constantino MD  01/11/23 6762

## 2023-01-12 NOTE — ED PROVIDER NOTES
EKG Interpretation    Interpreted by emergency department physician  Time performed: 4676  Time read: 1342    Rhythm: Sinus  Ventricular Rate: 76  QRS Axis: 2  Ectopy: Sinus arrhythmia  Conduction: Sinus rhythm with sinus arrhythmia with inverted T waves throughout the EKG. No ST segment elevation is noted. ST Segments: normal  T Waves: Inverted throughout the EKG  Q Waves: None noted    Other findings: Motion artifact but EKG is readable    Compared to EKG on: 11/2/2016 and appears unchanged    Clinical Impression: Sinus rhythm with sinus arrhythmia and inverted T waves throughout the EKG. There is motion artifact but EKG is readable. This is compared to an EKG on 11/2/2016 appears unchanged.     [address], [address] [place], [place]  01/11/23 Carlos Walker none

## 2024-01-23 ENCOUNTER — HOSPITAL ENCOUNTER (EMERGENCY)
Age: 34
Discharge: HOME | End: 2024-01-23
Payer: COMMERCIAL

## 2024-01-23 VITALS
WEIGHT: 160.05 LBS | OXYGEN SATURATION: 98 % | DIASTOLIC BLOOD PRESSURE: 75 MMHG | TEMPERATURE: 98 F | RESPIRATION RATE: 18 BRPM | SYSTOLIC BLOOD PRESSURE: 131 MMHG | HEART RATE: 77 BPM | HEIGHT: 67 IN | BODY MASS INDEX: 25.12 KG/M2

## 2024-01-23 LAB — HCG UR QL: NEGATIVE

## 2024-01-23 PROCEDURE — 99283 EMERGENCY DEPT VISIT LOW MDM: CPT

## 2024-01-23 PROCEDURE — 84703 CHORIONIC GONADOTROPIN ASSAY: CPT

## 2024-01-23 RX ORDER — CEPHALEXIN 500 MG/1
500 CAPSULE ORAL 4 TIMES DAILY
Qty: 28 CAPSULE | Refills: 0 | Status: SHIPPED | OUTPATIENT
Start: 2024-01-23 | End: 2024-01-30

## 2024-01-23 RX ORDER — SULFAMETHOXAZOLE AND TRIMETHOPRIM 800; 160 MG/1; MG/1
1 TABLET ORAL 2 TIMES DAILY
Qty: 14 TABLET | Refills: 0 | Status: SHIPPED | OUTPATIENT
Start: 2024-01-23 | End: 2024-01-30

## 2024-01-23 SDOH — ECONOMIC STABILITY: HOUSING INSECURITY: IN THE LAST 12 MONTHS, HOW MANY PLACES HAVE YOU LIVED?: 1

## 2024-01-23 SDOH — ECONOMIC STABILITY: TRANSPORTATION INSECURITY
IN THE PAST 12 MONTHS, HAS THE LACK OF TRANSPORTATION KEPT YOU FROM MEDICAL APPOINTMENTS OR FROM GETTING MEDICATIONS?: NO

## 2024-01-23 SDOH — ECONOMIC STABILITY: HOUSING INSECURITY
IN THE LAST 12 MONTHS, WAS THERE A TIME WHEN YOU DID NOT HAVE A STEADY PLACE TO SLEEP OR SLEPT IN A SHELTER (INCLUDING NOW)?: NO

## 2024-01-23 SDOH — ECONOMIC STABILITY: TRANSPORTATION INSECURITY
IN THE PAST 12 MONTHS, HAS LACK OF TRANSPORTATION KEPT YOU FROM MEETINGS, WORK, OR FROM GETTING THINGS NEEDED FOR DAILY LIVING?: NO

## 2024-01-23 SDOH — ECONOMIC STABILITY: INCOME INSECURITY: IN THE LAST 12 MONTHS, WAS THERE A TIME WHEN YOU WERE NOT ABLE TO PAY THE MORTGAGE OR RENT ON TIME?: NO

## 2024-01-23 ASSESSMENT — PAIN - FUNCTIONAL ASSESSMENT: PAIN_FUNCTIONAL_ASSESSMENT: NONE - DENIES PAIN

## 2024-01-23 ASSESSMENT — SOCIAL DETERMINANTS OF HEALTH (SDOH): HOW HARD IS IT FOR YOU TO PAY FOR THE VERY BASICS LIKE FOOD, HOUSING, MEDICAL CARE, AND HEATING?: NOT VERY HARD

## 2024-01-23 NOTE — ED PROVIDER NOTES
are displayed. All other labs were within normal range or not returned as of this dictation.    EKG: When ordered, EKG's are interpreted by the Emergency Department Physician in the absence of a cardiologist.  Please see their note for interpretation of EKG.    RADIOLOGY:   Non-plain film images such as CT, Ultrasound and MRI are read by the radiologist. Plain radiographic images are visualized and preliminarily interpreted by the ED Provider with the below findings:          Interpretation per the Radiologist below, if available at the time of this note:    No orders to display     No results found.     No results found.    PROCEDURES   Unless otherwise noted below, none     Procedures    CRITICAL CARE TIME (.cctime)       PAST MEDICAL HISTORY      has a past medical history of Dehydration and Nausea & vomiting.     EMERGENCY DEPARTMENT COURSE and DIFFERENTIAL DIAGNOSIS/MDM:   Vitals:    Vitals:    24 1429   BP: 131/75   Pulse: 77   Resp: 18   Temp: 98 °F (36.7 °C)   TempSrc: Oral   SpO2: 98%   Weight: 72.6 kg (160 lb 0.9 oz)   Height: 1.702 m (5' 7\")       Patient was given the following medications:  Medications - No data to display          Is this patient to be included in the SEP-1 Core Measure due to severe sepsis or septic shock?   no        Chronic Conditions affecting care:    has a past medical history of Dehydration and Nausea & vomiting.    CONSULTS: (Who and What was discussed)  None      Records Reviewed (External and Source)   OB/GYN notes    CC/HPI Summary, DDx, ED Course, and Reassessment:     33-year-old presents with concern for dehiscence of  scar.  Minimal dehiscence.  No sign of systemic infection.  Minimal erythema.  No abscess or drainage at this time.  Tdap up-to-date.  Will start antibiotics and discharged with close PCP follow-up    Although hospitalization was considered given age, medical co morbidities and chief complaint; reassuring workup and symptom improvement on

## 2024-01-23 NOTE — PROGRESS NOTES
Provided pt with rent assistance and housing resources.    -Shaylee, screening and referral specialist

## 2024-01-23 NOTE — DISCHARGE INSTRUCTIONS
Please follow up with your gynecologist as we discussed.  Return for any worsening signs as we discussed